# Patient Record
Sex: MALE | Race: BLACK OR AFRICAN AMERICAN | NOT HISPANIC OR LATINO | URBAN - METROPOLITAN AREA
[De-identification: names, ages, dates, MRNs, and addresses within clinical notes are randomized per-mention and may not be internally consistent; named-entity substitution may affect disease eponyms.]

---

## 2023-03-22 ENCOUNTER — INPATIENT (INPATIENT)
Facility: HOSPITAL | Age: 43
LOS: 0 days | Discharge: HOME | End: 2023-03-23
Attending: UROLOGY | Admitting: UROLOGY
Payer: COMMERCIAL

## 2023-03-22 VITALS
HEIGHT: 70 IN | TEMPERATURE: 98 F | DIASTOLIC BLOOD PRESSURE: 97 MMHG | HEART RATE: 125 BPM | RESPIRATION RATE: 16 BRPM | WEIGHT: 179.9 LBS | SYSTOLIC BLOOD PRESSURE: 167 MMHG

## 2023-03-22 DIAGNOSIS — Z98.890 OTHER SPECIFIED POSTPROCEDURAL STATES: Chronic | ICD-10-CM

## 2023-03-22 DIAGNOSIS — N41.0 ACUTE PROSTATITIS: ICD-10-CM

## 2023-03-22 LAB
ALBUMIN SERPL ELPH-MCNC: 3.8 G/DL — SIGNIFICANT CHANGE UP (ref 3.3–5)
ALP SERPL-CCNC: 55 U/L — SIGNIFICANT CHANGE UP (ref 40–120)
ALT FLD-CCNC: 43 U/L — SIGNIFICANT CHANGE UP (ref 12–78)
AMPHET UR-MCNC: NEGATIVE — SIGNIFICANT CHANGE UP
ANION GAP SERPL CALC-SCNC: 10 MMOL/L — SIGNIFICANT CHANGE UP (ref 5–17)
APPEARANCE UR: ABNORMAL
APTT BLD: 27.1 SEC — LOW (ref 27.5–35.5)
AST SERPL-CCNC: 35 U/L — SIGNIFICANT CHANGE UP (ref 15–37)
BACTERIA # UR AUTO: ABNORMAL
BARBITURATES UR SCN-MCNC: NEGATIVE — SIGNIFICANT CHANGE UP
BASOPHILS # BLD AUTO: 0.05 K/UL — SIGNIFICANT CHANGE UP (ref 0–0.2)
BASOPHILS NFR BLD AUTO: 0.3 % — SIGNIFICANT CHANGE UP (ref 0–2)
BENZODIAZ UR-MCNC: NEGATIVE — SIGNIFICANT CHANGE UP
BILIRUB SERPL-MCNC: 0.8 MG/DL — SIGNIFICANT CHANGE UP (ref 0.2–1.2)
BILIRUB UR-MCNC: NEGATIVE — SIGNIFICANT CHANGE UP
BUN SERPL-MCNC: 13 MG/DL — SIGNIFICANT CHANGE UP (ref 7–23)
CALCIUM SERPL-MCNC: 8.9 MG/DL — SIGNIFICANT CHANGE UP (ref 8.5–10.1)
CHLORIDE SERPL-SCNC: 101 MMOL/L — SIGNIFICANT CHANGE UP (ref 96–108)
CO2 SERPL-SCNC: 24 MMOL/L — SIGNIFICANT CHANGE UP (ref 22–31)
COCAINE METAB.OTHER UR-MCNC: NEGATIVE — SIGNIFICANT CHANGE UP
COLOR SPEC: ABNORMAL
CREAT SERPL-MCNC: 1.06 MG/DL — SIGNIFICANT CHANGE UP (ref 0.5–1.3)
DIFF PNL FLD: ABNORMAL
EGFR: 91 ML/MIN/1.73M2 — SIGNIFICANT CHANGE UP
EOSINOPHIL # BLD AUTO: 0.06 K/UL — SIGNIFICANT CHANGE UP (ref 0–0.5)
EOSINOPHIL NFR BLD AUTO: 0.3 % — SIGNIFICANT CHANGE UP (ref 0–6)
ETHANOL SERPL-MCNC: <10 MG/DL — SIGNIFICANT CHANGE UP (ref 0–10)
GLUCOSE SERPL-MCNC: 122 MG/DL — HIGH (ref 70–99)
GLUCOSE UR QL: 250 MG/DL
HCT VFR BLD CALC: 41.7 % — SIGNIFICANT CHANGE UP (ref 39–50)
HGB BLD-MCNC: 14.5 G/DL — SIGNIFICANT CHANGE UP (ref 13–17)
IMM GRANULOCYTES NFR BLD AUTO: 0.4 % — SIGNIFICANT CHANGE UP (ref 0–0.9)
INR BLD: 1.12 RATIO — SIGNIFICANT CHANGE UP (ref 0.88–1.16)
KETONES UR-MCNC: ABNORMAL
LACTATE SERPL-SCNC: 2 MMOL/L — SIGNIFICANT CHANGE UP (ref 0.7–2)
LEUKOCYTE ESTERASE UR-ACNC: ABNORMAL
LYMPHOCYTES # BLD AUTO: 1.74 K/UL — SIGNIFICANT CHANGE UP (ref 1–3.3)
LYMPHOCYTES # BLD AUTO: 9.6 % — LOW (ref 13–44)
MCHC RBC-ENTMCNC: 30.5 PG — SIGNIFICANT CHANGE UP (ref 27–34)
MCHC RBC-ENTMCNC: 34.8 G/DL — SIGNIFICANT CHANGE UP (ref 32–36)
MCV RBC AUTO: 87.8 FL — SIGNIFICANT CHANGE UP (ref 80–100)
METHADONE UR-MCNC: NEGATIVE — SIGNIFICANT CHANGE UP
MONOCYTES # BLD AUTO: 1.14 K/UL — HIGH (ref 0–0.9)
MONOCYTES NFR BLD AUTO: 6.3 % — SIGNIFICANT CHANGE UP (ref 2–14)
NEUTROPHILS # BLD AUTO: 15.14 K/UL — HIGH (ref 1.8–7.4)
NEUTROPHILS NFR BLD AUTO: 83.1 % — HIGH (ref 43–77)
NITRITE UR-MCNC: NEGATIVE — SIGNIFICANT CHANGE UP
NRBC # BLD: 0 /100 WBCS — SIGNIFICANT CHANGE UP (ref 0–0)
OPIATES UR-MCNC: NEGATIVE — SIGNIFICANT CHANGE UP
PCP SPEC-MCNC: SIGNIFICANT CHANGE UP
PCP UR-MCNC: NEGATIVE — SIGNIFICANT CHANGE UP
PH UR: 6 — SIGNIFICANT CHANGE UP (ref 5–8)
PLATELET # BLD AUTO: 241 K/UL — SIGNIFICANT CHANGE UP (ref 150–400)
POTASSIUM SERPL-MCNC: 3.6 MMOL/L — SIGNIFICANT CHANGE UP (ref 3.5–5.3)
POTASSIUM SERPL-SCNC: 3.6 MMOL/L — SIGNIFICANT CHANGE UP (ref 3.5–5.3)
PROT SERPL-MCNC: 7.7 GM/DL — SIGNIFICANT CHANGE UP (ref 6–8.3)
PROT UR-MCNC: 500 MG/DL
PROTHROM AB SERPL-ACNC: 13.4 SEC — SIGNIFICANT CHANGE UP (ref 10.5–13.4)
RAPID RVP RESULT: SIGNIFICANT CHANGE UP
RBC # BLD: 4.75 M/UL — SIGNIFICANT CHANGE UP (ref 4.2–5.8)
RBC # FLD: 14.9 % — HIGH (ref 10.3–14.5)
RBC CASTS # UR COMP ASSIST: ABNORMAL /HPF (ref 0–4)
SARS-COV-2 RNA SPEC QL NAA+PROBE: SIGNIFICANT CHANGE UP
SODIUM SERPL-SCNC: 135 MMOL/L — SIGNIFICANT CHANGE UP (ref 135–145)
SP GR SPEC: 1.02 — SIGNIFICANT CHANGE UP (ref 1.01–1.02)
THC UR QL: NEGATIVE — SIGNIFICANT CHANGE UP
UROBILINOGEN FLD QL: 1 MG/DL
WBC # BLD: 18.21 K/UL — HIGH (ref 3.8–10.5)
WBC # FLD AUTO: 18.21 K/UL — HIGH (ref 3.8–10.5)
WBC UR QL: >50

## 2023-03-22 PROCEDURE — 99285 EMERGENCY DEPT VISIT HI MDM: CPT

## 2023-03-22 PROCEDURE — 99222 1ST HOSP IP/OBS MODERATE 55: CPT

## 2023-03-22 PROCEDURE — 74177 CT ABD & PELVIS W/CONTRAST: CPT | Mod: 26,MA

## 2023-03-22 PROCEDURE — 93010 ELECTROCARDIOGRAM REPORT: CPT

## 2023-03-22 PROCEDURE — 99221 1ST HOSP IP/OBS SF/LOW 40: CPT

## 2023-03-22 RX ORDER — PIPERACILLIN AND TAZOBACTAM 4; .5 G/20ML; G/20ML
3.38 INJECTION, POWDER, LYOPHILIZED, FOR SOLUTION INTRAVENOUS EVERY 8 HOURS
Refills: 0 | Status: DISCONTINUED | OUTPATIENT
Start: 2023-03-22 | End: 2023-03-23

## 2023-03-22 RX ORDER — ACETAMINOPHEN 500 MG
975 TABLET ORAL ONCE
Refills: 0 | Status: COMPLETED | OUTPATIENT
Start: 2023-03-22 | End: 2023-03-22

## 2023-03-22 RX ORDER — SODIUM CHLORIDE 9 MG/ML
2500 INJECTION INTRAMUSCULAR; INTRAVENOUS; SUBCUTANEOUS ONCE
Refills: 0 | Status: COMPLETED | OUTPATIENT
Start: 2023-03-22 | End: 2023-03-22

## 2023-03-22 RX ORDER — SODIUM CHLORIDE 9 MG/ML
1000 INJECTION, SOLUTION INTRAVENOUS
Refills: 0 | Status: DISCONTINUED | OUTPATIENT
Start: 2023-03-22 | End: 2023-03-23

## 2023-03-22 RX ORDER — ACETAMINOPHEN 500 MG
975 TABLET ORAL EVERY 6 HOURS
Refills: 0 | Status: DISCONTINUED | OUTPATIENT
Start: 2023-03-22 | End: 2023-03-23

## 2023-03-22 RX ORDER — PIPERACILLIN AND TAZOBACTAM 4; .5 G/20ML; G/20ML
3.38 INJECTION, POWDER, LYOPHILIZED, FOR SOLUTION INTRAVENOUS ONCE
Refills: 0 | Status: COMPLETED | OUTPATIENT
Start: 2023-03-22 | End: 2023-03-22

## 2023-03-22 RX ORDER — KETOROLAC TROMETHAMINE 30 MG/ML
15 SYRINGE (ML) INJECTION EVERY 6 HOURS
Refills: 0 | Status: DISCONTINUED | OUTPATIENT
Start: 2023-03-22 | End: 2023-03-23

## 2023-03-22 RX ORDER — IOHEXOL 300 MG/ML
30 INJECTION, SOLUTION INTRAVENOUS ONCE
Refills: 0 | Status: COMPLETED | OUTPATIENT
Start: 2023-03-22 | End: 2023-03-22

## 2023-03-22 RX ORDER — HYDRALAZINE HCL 50 MG
10 TABLET ORAL ONCE
Refills: 0 | Status: COMPLETED | OUTPATIENT
Start: 2023-03-22 | End: 2023-03-22

## 2023-03-22 RX ORDER — HYDRALAZINE HCL 50 MG
10 TABLET ORAL EVERY 8 HOURS
Refills: 0 | Status: DISCONTINUED | OUTPATIENT
Start: 2023-03-22 | End: 2023-03-23

## 2023-03-22 RX ORDER — CEFAZOLIN SODIUM 1 G
1000 VIAL (EA) INJECTION ONCE
Refills: 0 | Status: COMPLETED | OUTPATIENT
Start: 2023-03-22 | End: 2023-03-22

## 2023-03-22 RX ORDER — AMLODIPINE BESYLATE 2.5 MG/1
5 TABLET ORAL DAILY
Refills: 0 | Status: DISCONTINUED | OUTPATIENT
Start: 2023-03-22 | End: 2023-03-23

## 2023-03-22 RX ADMIN — SODIUM CHLORIDE 125 MILLILITER(S): 9 INJECTION, SOLUTION INTRAVENOUS at 19:53

## 2023-03-22 RX ADMIN — PIPERACILLIN AND TAZOBACTAM 200 GRAM(S): 4; .5 INJECTION, POWDER, LYOPHILIZED, FOR SOLUTION INTRAVENOUS at 10:12

## 2023-03-22 RX ADMIN — IOHEXOL 30 MILLILITER(S): 300 INJECTION, SOLUTION INTRAVENOUS at 04:52

## 2023-03-22 RX ADMIN — Medication 10 MILLIGRAM(S): at 16:22

## 2023-03-22 RX ADMIN — SODIUM CHLORIDE 2500 MILLILITER(S): 9 INJECTION INTRAMUSCULAR; INTRAVENOUS; SUBCUTANEOUS at 03:29

## 2023-03-22 RX ADMIN — PIPERACILLIN AND TAZOBACTAM 25 GRAM(S): 4; .5 INJECTION, POWDER, LYOPHILIZED, FOR SOLUTION INTRAVENOUS at 22:02

## 2023-03-22 RX ADMIN — Medication 975 MILLIGRAM(S): at 12:48

## 2023-03-22 RX ADMIN — Medication 15 MILLIGRAM(S): at 10:26

## 2023-03-22 RX ADMIN — Medication 100 MILLIGRAM(S): at 06:08

## 2023-03-22 RX ADMIN — Medication 975 MILLIGRAM(S): at 03:28

## 2023-03-22 RX ADMIN — AMLODIPINE BESYLATE 5 MILLIGRAM(S): 2.5 TABLET ORAL at 18:56

## 2023-03-22 RX ADMIN — Medication 975 MILLIGRAM(S): at 04:25

## 2023-03-22 RX ADMIN — Medication 1000 MILLIGRAM(S): at 06:38

## 2023-03-22 RX ADMIN — SODIUM CHLORIDE 125 MILLILITER(S): 9 INJECTION, SOLUTION INTRAVENOUS at 13:38

## 2023-03-22 RX ADMIN — PIPERACILLIN AND TAZOBACTAM 25 GRAM(S): 4; .5 INJECTION, POWDER, LYOPHILIZED, FOR SOLUTION INTRAVENOUS at 13:02

## 2023-03-22 RX ADMIN — SODIUM CHLORIDE 2500 MILLILITER(S): 9 INJECTION INTRAMUSCULAR; INTRAVENOUS; SUBCUTANEOUS at 04:29

## 2023-03-22 RX ADMIN — Medication 15 MILLIGRAM(S): at 10:12

## 2023-03-22 NOTE — PATIENT PROFILE ADULT - FALL HARM RISK - UNIVERSAL INTERVENTIONS
Bed in lowest position, wheels locked, appropriate side rails in place/Call bell, personal items and telephone in reach/Instruct patient to call for assistance before getting out of bed or chair/Non-slip footwear when patient is out of bed/[place] to call system/Physically safe environment - no spills, clutter or unnecessary equipment/Purposeful Proactive Rounding/Room/bathroom lighting operational, light cord in reach

## 2023-03-22 NOTE — ED ADULT TRIAGE NOTE - CHIEF COMPLAINT QUOTE
patient reports rectal pain and slight discomfort after urinating x today. states that he had this before and it was prostatitis. tremors and diaphoretic in triage. last drink yesterday.

## 2023-03-22 NOTE — ED PROVIDER NOTE - PROGRESS NOTE DETAILS
pt. has prostatitis on CT, elevated WBCs, UTI on UA  will need admission for prostatitis, IV antbx  discussed with Dr. Mata, uro, agrees with admit  antbx given, surgical PA called for admit

## 2023-03-22 NOTE — H&P ADULT - NS ATTEND AMEND GEN_ALL_CORE FT
per above  has h/o prior prostatitis  currently feeling better  no dysuria or feve-has wbc 18 with lactate of 2    to observe today-likely d/c in am pending clinical condition  when resolved will need exam and psa-43 yo with  ancestry-discussed high risk groups for CA P

## 2023-03-22 NOTE — H&P ADULT - HISTORY OF PRESENT ILLNESS
Patient is a 42 year old male with PMH HTN, prostatitis x 1, PSH orchiopexy who presents to ED c/o dull, 7/10 pelvic and rectal pain x 1 day. Further describes pain as "heavy pressure". Denies fever, chills, abdominal pain, difficulty urinating, gross hematuria, urinary frequency.

## 2023-03-22 NOTE — ED PROVIDER NOTE - CARE PROVIDER_API CALL
Bulmaro Bansal)  Urology  [address] [place] [address]  Phone: [phone]  Fax: [phone]  Follow Up Time: Urgent

## 2023-03-22 NOTE — CONSULT NOTE ADULT - SUBJECTIVE AND OBJECTIVE BOX
HPI: Patient is a 42 year old male with PMH HTN, prostatitis x 1, PSH orchiopexy who presents to ED c/o dull, 7/10 pelvic and rectal pain x 1 day. Further describes pain as "heavy pressure". Denies fever, chills, abdominal pain, difficulty urinating, gross hematuria, urinary frequency. Patient was found to have acute prostatitis on CT abd and was admitted to urology service and was started on iv zosyn with IVF. Patient was also given iv hydralazine for uncontrolled blood pressure. Hospitalist service is consulted for HTN management.   Patient reported having hx of HTN and taking oral medication for it in the past but he stopped taking on his own.   He reported checking blood pressure off and on and reported it in the range of 150's. No report of any new focal weakness, headache or vision changes or hematuria.     ROS: Except pertinent positives and negatives as mentioned in HPI, all other review of systems including constitutional, HEENT, CVS, Resp, GI, , MSK,  Psychiatry, Neurology, are reviewed and found unremarkable at the time of my evaluation.       Allergies:   levofloxacin (Rash)      HOME MEDICATIONS:  None    Past Medical History:   HTN (hypertension)  chronic tobacco abuse  Hx of alcohol abuse      Past Surgical History:   S/P orchiopexy    Family History:    No history of premature heart problems, cancers reported in family members    Social History:   Denied any illicit drug abuse. Patient used to drink heavy and used to have alcohol withdrawals but in last couple of months, patient started drinking alcohol only over the weekends. No withdrawals reported in last couple of months.   Patient smoked x 7-8 years and quit 1 month ago.   Used to smoke 1 pack in a week.       EXAM:     T(C): 37.1 (23 @ 14:30), Max: 37.5 (23 @ 07:18)  HR: 96 (23 @ 16:58) (75 - 125)  BP: 157/83 (23 @ 16:58) (121/77 - 183/99)  RR: 16 (23 @ 14:30) (16 - 20)  SpO2: 100% (23 @ 14:30) (97% - 100%)      General: NAD, conversant  HEENT: Head is atraumatic and normocephalic.   CVS: S1 S2 normal, RRR, no murmur  Resp: No labored breathing. CTA bilaterally, no wheezing, no crackles   GI: abd soft, non tender. + BS  MSK: Expected ROM in all extremities. No cyanosis. No Clubbing. No edema  Neurology: Grossly non focal.   Psychiatry: Alert, awake. Oriented x 3. Appropriate mood      LABS:                        14.5   18.21 )-----------( 241      ( 22 Mar 2023 03:24 )             41.7                 135  |  101  |  13  ----------------------------<  122<H>  3.6   |  24  |  1.06    Ca    8.9      22 Mar 2023 03:24    TPro  7.7  /  Alb  3.8  /  TBili  0.8  /  DBili  x   /  AST  35  /  ALT  43  /  AlkPhos  55                PT/INR - ( 22 Mar 2023 03:24 )   PT: 13.4 sec;   INR: 1.12 ratio         PTT - ( 22 Mar 2023 03:24 )  PTT:27.1 sec  Urinalysis Basic - ( 22 Mar 2023 03:24 )    Color: Denise / Appearance: Slightly Turbid / S.020 / pH: x  Gluc: x / Ketone: Small  / Bili: Negative / Urobili: 1 mg/dL   Blood: x / Protein: 500 mg/dL / Nitrite: Negative   Leuk Esterase: Moderate / RBC: 6-10 /HPF / WBC >50   Sq Epi: x / Non Sq Epi: x / Bacteria: Moderate      Interval Radiology studies: reviewed   < from: CT Abdomen and Pelvis w/ Oral Cont and w/ IV Cont (23 @ 05:51) >  Heterogeneously enlarged prostate gland suggesting prostatitis. No focal   collection.  Mild thickening of the rectum suggests prostatitis.  Although the urinary bladder is underdistended, there is wall thickening   and hyperemia suggesting cystitis. Correlate with urinalysis.    Fatty liver.    < end of copied text >      INPATIENT MEDICATIONS:   MEDICATIONS  (STANDING):  acetaminophen     Tablet .. 975 milliGRAM(s) Oral every 6 hours  amLODIPine   Tablet 5 milliGRAM(s) Oral daily  lactated ringers. 1000 milliLiter(s) (125 mL/Hr) IV Continuous <Continuous>  piperacillin/tazobactam IVPB.. 3.375 Gram(s) IV Intermittent every 8 hours    MEDICATIONS  (PRN):  hydrALAZINE Injectable 10 milliGRAM(s) IV Push every 8 hours PRN SBP>160  ketorolac   Injectable 15 milliGRAM(s) IV Push every 6 hours PRN Moderate Pain (4 - 6)

## 2023-03-22 NOTE — ED PROVIDER NOTE - CLINICAL SUMMARY MEDICAL DECISION MAKING FREE TEXT BOX
41 yo M with possible prostatitis, uti  -basic labs, eoth, drug screen, blood cx, ua, cx, drug screen, lactate, coags, rvp, trop, CT ab/pel, EKg, iv, levofloxacin coverage, hydration bolus, tylenol for pain, monitor  -f/u results, reeval

## 2023-03-22 NOTE — ED PROVIDER NOTE - OBJECTIVE STATEMENT
43 yo M with 1 day of rectal pain, constant, first noticed while seated.  No inciting event.  Pt. felt this once before about 5 years ago and was diagnosed with prostatitis.  No other complaints.   Pt. denies constitutional symptoms.   ROS: negative for fever, cough, headache, chest pain, shortness of breath, abd pain, nausea, vomiting, diarrhea, rash, paresthesia, and weakness--all other systems reviewed are negative.   PMH: prostatitis in past, pt. denies other PMH; Meds: Denies; SH: Denies smoking/drinking/drug use

## 2023-03-22 NOTE — ED PROVIDER NOTE - PHYSICAL EXAMINATION
Vitals: HTn at 148/82, otherwise WNL  Gen: AAOx3, NAD, sitting comfortably in stretcher, calm, non-toxic  Head: ncat, perrla, eomi b/l  Neck: supple, no lymphadenopathy, no midline deviation  Heart: rrr, no m/r/g  Lungs: CTA b/l, no rales/ronchi/wheezes  Abd: soft, nontender, non-distended, no rebound or guarding  Ext: no clubbing/cyanosis/edema  Neuro: sensation and muscle strength intact b/l, steady gait

## 2023-03-22 NOTE — ED PROVIDER NOTE - CARE PLAN
Principal Discharge DX:	Acute UTI   1 Principal Discharge DX:	Prostatitis  Secondary Diagnosis:	Acute UTI

## 2023-03-22 NOTE — CONSULT NOTE ADULT - ASSESSMENT
Patient is a 42 year old male with PMH HTN, prostatitis x 1, PSH orchiopexy who presents to ED c/o dull, 7/10 pelvic and rectal pain x 1 day. Further describes pain as "heavy pressure". Denies fever, chills, abdominal pain, difficulty urinating, gross hematuria, urinary frequency. Patient was found to have acute prostatitis on CT abd and was admitted to urology service and was started on iv zosyn with IVF. Patient was also given iv hydralazine for uncontrolled blood pressure. Hospitalist service is consulted for HTN management.   Patient reported having hx of HTN and taking oral medication for it in the past but he stopped taking on his own.   He reported checking blood pressure off and on and reported it in the range of 150's. No report of any new focal weakness, headache or vision changes or hematuria.     Acute prostatitis. Cont iv zosyn and IVF. follow cultures and de-escalate antibiotic therapy accordingly. Trend leukocytosis.   HTN. uncontrolled due to non compliance. Start low dose amlodipine with prn iv hydralazine. Monitor. Low salt diet. Will try to discontinue or slow down IVF when ok with surgery service.   Chronic tobacco abuse. counseling provided. Patient refused nicotine patch  Hx of alcohol abuse and alcohol withdrawals in the past. no withdrawal at this time. Monitor for any withdrawal while inpatient.   Overweight. Diet and exercise.     Full code  DVT ppx: Low risk.   Thank you for consulting hospitalist service, we will continue to follow along.

## 2023-03-22 NOTE — ED ADULT NURSE REASSESSMENT NOTE - NS ED NURSE REASSESS COMMENT FT1
received pt from previous RN with no acute distress, non labored breathing on room air, pt is A&0x3, pt c/o headache and states he is hungry, pt is aware of care plan of being admitted, MD made aware of pt's complaints, will bring pt breakfast tray, safety precautions are in place, nursing care continues
Abx picked up from pharmacy, pt in CT at this time. Will initiate abx upon completion of CT.

## 2023-03-23 ENCOUNTER — TRANSCRIPTION ENCOUNTER (OUTPATIENT)
Age: 43
End: 2023-03-23

## 2023-03-23 VITALS
HEART RATE: 88 BPM | DIASTOLIC BLOOD PRESSURE: 95 MMHG | SYSTOLIC BLOOD PRESSURE: 151 MMHG | RESPIRATION RATE: 18 BRPM | TEMPERATURE: 98 F | OXYGEN SATURATION: 100 %

## 2023-03-23 LAB
ANION GAP SERPL CALC-SCNC: 6 MMOL/L — SIGNIFICANT CHANGE UP (ref 5–17)
BASOPHILS # BLD AUTO: 0.05 K/UL — SIGNIFICANT CHANGE UP (ref 0–0.2)
BASOPHILS NFR BLD AUTO: 0.3 % — SIGNIFICANT CHANGE UP (ref 0–2)
BUN SERPL-MCNC: 7 MG/DL — SIGNIFICANT CHANGE UP (ref 7–23)
C TRACH RRNA SPEC QL NAA+PROBE: SIGNIFICANT CHANGE UP
CALCIUM SERPL-MCNC: 9.4 MG/DL — SIGNIFICANT CHANGE UP (ref 8.5–10.1)
CHLORIDE SERPL-SCNC: 102 MMOL/L — SIGNIFICANT CHANGE UP (ref 96–108)
CO2 SERPL-SCNC: 25 MMOL/L — SIGNIFICANT CHANGE UP (ref 22–31)
CREAT SERPL-MCNC: 0.91 MG/DL — SIGNIFICANT CHANGE UP (ref 0.5–1.3)
EGFR: 109 ML/MIN/1.73M2 — SIGNIFICANT CHANGE UP
EOSINOPHIL # BLD AUTO: 0.05 K/UL — SIGNIFICANT CHANGE UP (ref 0–0.5)
EOSINOPHIL NFR BLD AUTO: 0.3 % — SIGNIFICANT CHANGE UP (ref 0–6)
GLUCOSE SERPL-MCNC: 82 MG/DL — SIGNIFICANT CHANGE UP (ref 70–99)
HCT VFR BLD CALC: 42.8 % — SIGNIFICANT CHANGE UP (ref 39–50)
HGB BLD-MCNC: 14.4 G/DL — SIGNIFICANT CHANGE UP (ref 13–17)
IMM GRANULOCYTES NFR BLD AUTO: 0.6 % — SIGNIFICANT CHANGE UP (ref 0–0.9)
LYMPHOCYTES # BLD AUTO: 14.3 % — SIGNIFICANT CHANGE UP (ref 13–44)
LYMPHOCYTES # BLD AUTO: 2.28 K/UL — SIGNIFICANT CHANGE UP (ref 1–3.3)
MCHC RBC-ENTMCNC: 29.7 PG — SIGNIFICANT CHANGE UP (ref 27–34)
MCHC RBC-ENTMCNC: 33.6 G/DL — SIGNIFICANT CHANGE UP (ref 32–36)
MCV RBC AUTO: 88.2 FL — SIGNIFICANT CHANGE UP (ref 80–100)
MONOCYTES # BLD AUTO: 0.71 K/UL — SIGNIFICANT CHANGE UP (ref 0–0.9)
MONOCYTES NFR BLD AUTO: 4.4 % — SIGNIFICANT CHANGE UP (ref 2–14)
N GONORRHOEA RRNA SPEC QL NAA+PROBE: SIGNIFICANT CHANGE UP
NEUTROPHILS # BLD AUTO: 12.82 K/UL — HIGH (ref 1.8–7.4)
NEUTROPHILS NFR BLD AUTO: 80.1 % — HIGH (ref 43–77)
NRBC # BLD: 0 /100 WBCS — SIGNIFICANT CHANGE UP (ref 0–0)
PLATELET # BLD AUTO: 224 K/UL — SIGNIFICANT CHANGE UP (ref 150–400)
POTASSIUM SERPL-MCNC: 3.7 MMOL/L — SIGNIFICANT CHANGE UP (ref 3.5–5.3)
POTASSIUM SERPL-SCNC: 3.7 MMOL/L — SIGNIFICANT CHANGE UP (ref 3.5–5.3)
RBC # BLD: 4.85 M/UL — SIGNIFICANT CHANGE UP (ref 4.2–5.8)
RBC # FLD: 14.5 % — SIGNIFICANT CHANGE UP (ref 10.3–14.5)
SODIUM SERPL-SCNC: 133 MMOL/L — LOW (ref 135–145)
SPECIMEN SOURCE: SIGNIFICANT CHANGE UP
WBC # BLD: 16 K/UL — HIGH (ref 3.8–10.5)
WBC # FLD AUTO: 16 K/UL — HIGH (ref 3.8–10.5)

## 2023-03-23 PROCEDURE — 76856 US EXAM PELVIC COMPLETE: CPT | Mod: 26

## 2023-03-23 PROCEDURE — 99232 SBSQ HOSP IP/OBS MODERATE 35: CPT

## 2023-03-23 PROCEDURE — 99233 SBSQ HOSP IP/OBS HIGH 50: CPT

## 2023-03-23 RX ORDER — AMLODIPINE BESYLATE 2.5 MG/1
10 TABLET ORAL DAILY
Refills: 0 | Status: DISCONTINUED | OUTPATIENT
Start: 2023-03-24 | End: 2023-03-23

## 2023-03-23 RX ORDER — AMLODIPINE BESYLATE 2.5 MG/1
1 TABLET ORAL
Qty: 30 | Refills: 0
Start: 2023-03-23

## 2023-03-23 RX ORDER — AMLODIPINE BESYLATE 2.5 MG/1
5 TABLET ORAL ONCE
Refills: 0 | Status: COMPLETED | OUTPATIENT
Start: 2023-03-23 | End: 2023-03-23

## 2023-03-23 RX ADMIN — PIPERACILLIN AND TAZOBACTAM 25 GRAM(S): 4; .5 INJECTION, POWDER, LYOPHILIZED, FOR SOLUTION INTRAVENOUS at 14:48

## 2023-03-23 RX ADMIN — AMLODIPINE BESYLATE 5 MILLIGRAM(S): 2.5 TABLET ORAL at 18:27

## 2023-03-23 RX ADMIN — Medication 975 MILLIGRAM(S): at 00:46

## 2023-03-23 RX ADMIN — SODIUM CHLORIDE 125 MILLILITER(S): 9 INJECTION, SOLUTION INTRAVENOUS at 15:13

## 2023-03-23 RX ADMIN — AMLODIPINE BESYLATE 5 MILLIGRAM(S): 2.5 TABLET ORAL at 05:44

## 2023-03-23 RX ADMIN — PIPERACILLIN AND TAZOBACTAM 25 GRAM(S): 4; .5 INJECTION, POWDER, LYOPHILIZED, FOR SOLUTION INTRAVENOUS at 05:44

## 2023-03-23 RX ADMIN — Medication 10 MILLIGRAM(S): at 20:57

## 2023-03-23 NOTE — DISCHARGE NOTE NURSING/CASE MANAGEMENT/SOCIAL WORK - NSDCPEFALRISK_GEN_ALL_CORE
For information on Fall & Injury Prevention, visit: [email or web address] OR  [email or web address] OR  https://www.cdc.gov/steadi/patient.html

## 2023-03-23 NOTE — DISCHARGE NOTE PROVIDER - NSDCCPCAREPLAN_GEN_ALL_CORE_FT
PRINCIPAL DISCHARGE DIAGNOSIS  Diagnosis: Prostatitis  Assessment and Plan of Treatment:       SECONDARY DISCHARGE DIAGNOSES  Diagnosis: Acute UTI  Assessment and Plan of Treatment:

## 2023-03-23 NOTE — DISCHARGE NOTE PROVIDER - HOSPITAL COURSE
42M h/o HTN, prostatitis, orchiopexy a/w prostatitis. IV antibiotics were given. Medicine was consulted for co-management. US showed enlarged prostate, no abscess. At the time of discharge, the patient was hemodynamically stable, was tolerating PO diet, was voiding urine, was ambulating, and was comfortable without any pain. 42M h/o HTN, prostatitis, orchiopexy a/w prostatitis. IV antibiotics were given. Medicine was consulted for co-management. US showed enlarged prostate, no abscess. At the time of discharge, the patient was hemodynamically stable, was tolerating PO diet, was voiding urine, was ambulating, and was comfortable without any pain.     I agree with the statements above.  Kasey

## 2023-03-23 NOTE — DISCHARGE NOTE PROVIDER - CARE PROVIDER_API CALL
Robert Mata  Urology  [address] [id number]  Phone: [phone]  Fax: [phone]  Phone: (   )    -  Fax: (   )    -  Follow Up Time:

## 2023-03-23 NOTE — DISCHARGE NOTE PROVIDER - NSDCFUADDAPPT_GEN_ALL_CORE_FT
Follow up in 1 week with Dr. Mata. Please call the office to make an appointment.    Follow up with your primary care physician in 1 week. Please call the office to make an appointment.

## 2023-03-23 NOTE — PROGRESS NOTE ADULT - SUBJECTIVE AND OBJECTIVE BOX
Pt seen and examined at bedside in no distress.  No complaints. Symptoms entirely resolved.  Voiding without issue. No fevers/chills.     T(F): 98.7 (03-23-23 @ 10:39), Max: 99.4 (03-22-23 @ 23:31)  HR: 108 (03-23-23 @ 10:39) (75 - 108)  BP: 154/107 (03-23-23 @ 10:39) (143/82 - 183/99)  RR: 18 (03-23-23 @ 10:39) (16 - 18)  SpO2: 99% (03-23-23 @ 10:39) (98% - 100%)    PHYSICAL EXAM:  General: Alert & oriented x 3  CV: +S1S2 tachycardic   Lung: Respirations nonlabored   Abdomen: Soft  Extremities: No pedal edema b/l   : No SP tenderness, no palpable bladder distention, NL external genitalia     LABS:                        14.4   16.00 )-----------( 224      ( 23 Mar 2023 05:45 )             42.8     03-23    133<L>  |  102  |  7   ----------------------------<  82  3.7   |  25  |  0.91    Ca    9.4      23 Mar 2023 05:45    TPro  7.7  /  Alb  3.8  /  TBili  0.8  /  DBili  x   /  AST  35  /  ALT  43  /  AlkPhos  55  03-22    PT/INR - ( 22 Mar 2023 03:24 )   PT: 13.4 sec;   INR: 1.12 ratio      A/P: 42M a/w prostatitis, hypertensive   -- prostate US  -- discussed case with medical attending; stable for d/c from their standpoint on norvasc with OP f/u   -- discharge planning on cipro 500mg bid x 5 days pending prostate US  -- discussed with Dr. Mata 
CHIEF COMPLAINT: Follow up of acute prostatitis and HTN  no headache  rectal pain better  no vomiting  no diarrhea       PHYSICAL EXAM:    GENERAL:  no acute distress   CHEST/LUNG:  No wheezing, no crackles   HEART: Regular rate and rhythm; No murmurs  ABDOMEN: Soft, Nontender, Nondistended; Bowel sounds present  EXTREMITIES:  No clubbing, cyanosis, or edema  NERVOUS SYSTEM:  Grossly non focal.  Psychiatry: AAO x 3, mood is appropriate       OBJECTIVE DATA:   Vital Signs Last 24 Hrs  T(C): 37.1 (23 Mar 2023 04:08), Max: 37.4 (22 Mar 2023 23:31)  T(F): 98.7 (23 Mar 2023 04:08), Max: 99.4 (22 Mar 2023 23:31)  HR: 87 (23 Mar 2023 04:08) (75 - 99)  BP: 143/82 (23 Mar 2023 04:08) (143/82 - 183/99)  BP(mean): --  RR: 18 (23 Mar 2023 04:08) (16 - 18)  SpO2: 99% (23 Mar 2023 04:08) (98% - 100%)    Parameters below as of 23 Mar 2023 04:08  Patient On (Oxygen Delivery Method): room air      LABS:                        14.4   16.00 )-----------( 224      ( 23 Mar 2023 05:45 )             42.8             03-23    133<L>  |  102  |  7   ----------------------------<  82  3.7   |  25  |  0.91    Ca    9.4      23 Mar 2023 05:45    TPro  7.7  /  Alb  3.8  /  TBili  0.8  /  DBili  x   /  AST  35  /  ALT  43  /  AlkPhos  55  03-22              PT/INR - ( 22 Mar 2023 03:24 )   PT: 13.4 sec;   INR: 1.12 ratio         PTT - ( 22 Mar 2023 03:24 )  PTT:27.1 sec  Urinalysis Basic - ( 22 Mar 2023 03:24 )    Color: Denise / Appearance: Slightly Turbid / S.020 / pH: x  Gluc: x / Ketone: Small  / Bili: Negative / Urobili: 1 mg/dL   Blood: x / Protein: 500 mg/dL / Nitrite: Negative   Leuk Esterase: Moderate / RBC: 6-10 /HPF / WBC >50   Sq Epi: x / Non Sq Epi: x / Bacteria: Moderate            CAPILLARY BLOOD GLUCOSE            MEDICATIONS  (STANDING):  acetaminophen     Tablet .. 975 milliGRAM(s) Oral every 6 hours  amLODIPine   Tablet 5 milliGRAM(s) Oral daily  lactated ringers. 1000 milliLiter(s) (125 mL/Hr) IV Continuous <Continuous>  piperacillin/tazobactam IVPB.. 3.375 Gram(s) IV Intermittent every 8 hours    MEDICATIONS  (PRN):  hydrALAZINE Injectable 10 milliGRAM(s) IV Push every 8 hours PRN SBP>160  ketorolac   Injectable 15 milliGRAM(s) IV Push every 6 hours PRN Moderate Pain (4 - 6)

## 2023-03-23 NOTE — PROGRESS NOTE ADULT - ASSESSMENT
Patient is a 42 year old male with PMH HTN, prostatitis x 1, PSH orchiopexy who presents to ED c/o dull, 7/10 pelvic and rectal pain x 1 day. Further describes pain as "heavy pressure". Denies fever, chills, abdominal pain, difficulty urinating, gross hematuria, urinary frequency. Patient was found to have acute prostatitis on CT abd and was admitted to urology service and was started on iv zosyn with IVF. Patient was also given iv hydralazine for uncontrolled blood pressure. Hospitalist service is consulted for HTN management.   Patient reported having hx of HTN and taking oral medication for it in the past but he stopped taking on his own.   He reported checking blood pressure off and on and reported it in the range of 150's. No report of any new focal weakness, headache or vision changes or hematuria.     Acute prostatitis. Cont iv zosyn and IVF. follow cultures and de-escalate antibiotic therapy accordingly.   HTN. uncontrolled due to non compliance. better controlled. cont current management and monitor closely. Discussed with surgery service.   Chronic tobacco abuse. counseling provided. Patient refused nicotine patch  Hx of alcohol abuse and alcohol withdrawals in the past. no withdrawal at this time. Monitor for any withdrawal while inpatient.   Overweight. Diet and exercise.     Full code  DVT ppx: Low risk.   Thank you for consulting hospitalist service, we will continue to follow along.        Patient is a 42 year old male with PMH HTN, prostatitis x 1, PSH orchiopexy who presents to ED c/o dull, 7/10 pelvic and rectal pain x 1 day. Further describes pain as "heavy pressure". Denies fever, chills, abdominal pain, difficulty urinating, gross hematuria, urinary frequency. Patient was found to have acute prostatitis on CT abd and was admitted to urology service and was started on iv zosyn with IVF. Patient was also given iv hydralazine for uncontrolled blood pressure. Hospitalist service is consulted for HTN management.   Patient reported having hx of HTN and taking oral medication for it in the past but he stopped taking on his own.   He reported checking blood pressure off and on and reported it in the range of 150's. No report of any new focal weakness, headache or vision changes or hematuria.     Acute prostatitis. Cont iv zosyn and IVF. follow cultures and de-escalate antibiotic therapy accordingly.   HTN. uncontrolled due to non compliance. better controlled. No contraindications to discharging patient home on oral norvasc 10 mg daily. advised patient to check his blood pressure at home and keep a log for PCP. Low sodium diet.   Chronic tobacco abuse. counseling provided. Patient refused nicotine patch  Hx of alcohol abuse and alcohol withdrawals in the past. no withdrawal at this time.   Overweight. Diet and exercise.     Full code  DVT ppx: Low risk.   Thank you for consulting hospitalist service, we will continue to follow along.

## 2023-03-23 NOTE — DISCHARGE NOTE PROVIDER - NSDCFUADDINST_GEN_ALL_CORE_FT
Take antibiotics and blood pressure medication as prescribed. Record blood pressure at home.   Activity as tolerated. Rest as needed. Call for any fever, severe pain, or other abnormalities.

## 2023-03-23 NOTE — DISCHARGE NOTE PROVIDER - NSDCMRMEDTOKEN_GEN_ALL_CORE_FT
sulfamethoxazole-trimethoprim 400 mg-80 mg oral tablet: 2 tab(s) orally 2 times a day   amLODIPine 10 mg oral tablet: 1 tab(s) orally once a day   sulfamethoxazole-trimethoprim 400 mg-80 mg oral tablet: 2 tab(s) orally 2 times a day

## 2023-03-23 NOTE — CHART NOTE - NSCHARTNOTEFT_GEN_A_CORE
Notified by RN that patient is adamantly requesting to go home now. Patient seen and examined at bedside. Denies pain or any complaints. Voiding clear yellow urine well, no dysuria or hematuria. Denies cp, sob, dizziness.    Vital Signs Last 24 Hrs  T(C): 36.9 (23 Mar 2023 20:52), Max: 37.4 (22 Mar 2023 23:31)  T(F): 98.4 (23 Mar 2023 20:52), Max: 99.4 (22 Mar 2023 23:31)  HR: 94 (23 Mar 2023 20:52) (87 - 108)  BP: 167/113 (23 Mar 2023 20:52) (143/82 - 167/113)  BP(mean): --  RR: 18 (23 Mar 2023 20:52) (16 - 18)  SpO2: 100% (23 Mar 2023 20:52) (98% - 100%)  Parameters below as of 23 Mar 2023 10:39  Patient On (Oxygen Delivery Method): room air    PHYSICAL EXAM:  CONSTITUTIONAL: NAD  HEENT:  Atraumatic, Normocephalic  RESPIRATORY: Clear to ausculation, bilaterally   CARDIOVASCULAR: RRR S1S2  GASTROINTESTINAL: non distended, +BS, soft, non tender, no guarding  : No suprapubic tenderness or bladder distention.   MUSCULOSKELETAL:  calf soft, non tender b/l    < from: US Pelvis Complete (US Pelvis Complete .) (03.23.23 @ 15:00) >    FINDINGS:  Urinary bladder: Within normal limits. Prevoid volume was 188 cc. Patient   unable to void.  Prostate: Enlarged measuring approximately 39 cc in volume. The prostate   is heterogeneous. No sonographicallyconvincing collection is identified   to suggest an abscess. If needed correlation with MR imaging may be   considered.    IMPRESSION:  Enlarged prostate. No sonographic evidence of an abscess.    < end of copied text >        42M h/o HTN, prostatitis, orchiopexy a/w prostatitis. Requesting to go home.  -As discussed with Dr. Mata, will d/c patient now on Bactrim BID x 7 days and Amlodipine. Pt will follow up in one week with Dr. Mata and his PCP.  -BP medication given and RN will recheck BP before d/c

## 2023-03-23 NOTE — DISCHARGE NOTE PROVIDER - PROVIDER TOKENS
FREE:[LAST:[Adolfo],FIRST:[Robert],PHONE:[(   )    -],FAX:[(   )    -],ADDRESS:[Urology  [address] [place] [address]  Phone: [phone]  Fax: [phone]]]

## 2023-03-23 NOTE — DISCHARGE NOTE NURSING/CASE MANAGEMENT/SOCIAL WORK - PATIENT PORTAL LINK FT
You can access the FollowMyHealth Patient Portal offered by [facility] by registering at the following website: [email or web address]. By joining [employer] [facility]’s FollowMyHealth portal, you will also be able to view your health information using other applications (apps) compatible with our system.

## 2023-03-27 PROBLEM — I10 ESSENTIAL (PRIMARY) HYPERTENSION: Chronic | Status: ACTIVE | Noted: 2023-03-22

## 2023-03-27 LAB
CULTURE RESULTS: SIGNIFICANT CHANGE UP
CULTURE RESULTS: SIGNIFICANT CHANGE UP
SPECIMEN SOURCE: SIGNIFICANT CHANGE UP
SPECIMEN SOURCE: SIGNIFICANT CHANGE UP

## 2023-03-28 DIAGNOSIS — I10 ESSENTIAL (PRIMARY) HYPERTENSION: ICD-10-CM

## 2023-03-28 DIAGNOSIS — N41.0 ACUTE PROSTATITIS: ICD-10-CM

## 2023-03-28 DIAGNOSIS — Z72.0 TOBACCO USE: ICD-10-CM

## 2023-03-28 DIAGNOSIS — K62.89 OTHER SPECIFIED DISEASES OF ANUS AND RECTUM: ICD-10-CM

## 2023-03-28 DIAGNOSIS — E11.65 TYPE 2 DIABETES MELLITUS WITH HYPERGLYCEMIA: ICD-10-CM

## 2023-03-28 DIAGNOSIS — N39.0 URINARY TRACT INFECTION, SITE NOT SPECIFIED: ICD-10-CM

## 2023-04-04 ENCOUNTER — APPOINTMENT (OUTPATIENT)
Dept: UROLOGY | Facility: CLINIC | Age: 43
End: 2023-04-04
Payer: COMMERCIAL

## 2023-04-04 VITALS
DIASTOLIC BLOOD PRESSURE: 88 MMHG | BODY MASS INDEX: 26.48 KG/M2 | TEMPERATURE: 98 F | SYSTOLIC BLOOD PRESSURE: 149 MMHG | WEIGHT: 185 LBS | OXYGEN SATURATION: 98 % | HEIGHT: 70 IN | HEART RATE: 84 BPM

## 2023-04-04 DIAGNOSIS — I10 ESSENTIAL (PRIMARY) HYPERTENSION: ICD-10-CM

## 2023-04-04 DIAGNOSIS — Z78.9 OTHER SPECIFIED HEALTH STATUS: ICD-10-CM

## 2023-04-04 PROCEDURE — 99213 OFFICE O/P EST LOW 20 MIN: CPT

## 2023-04-04 PROCEDURE — 51798 US URINE CAPACITY MEASURE: CPT

## 2023-04-04 RX ORDER — AMLODIPINE BESYLATE 10 MG/1
10 TABLET ORAL
Refills: 0 | Status: ACTIVE | COMMUNITY
Start: 2023-04-04

## 2023-04-04 RX ORDER — LEVOFLOXACIN 750 MG/1
TABLET, FILM COATED ORAL
Refills: 0 | Status: DISCONTINUED | COMMUNITY

## 2023-04-04 NOTE — PHYSICAL EXAM
[General Appearance - Well Developed] : well developed [General Appearance - Well Nourished] : well nourished [Normal Appearance] : normal appearance [Well Groomed] : well groomed [General Appearance - In No Acute Distress] : no acute distress [Edema] : no peripheral edema [Respiration, Rhythm And Depth] : normal respiratory rhythm and effort [Exaggerated Use Of Accessory Muscles For Inspiration] : no accessory muscle use [Abdomen Soft] : soft [Abdomen Tenderness] : non-tender [Costovertebral Angle Tenderness] : no ~M costovertebral angle tenderness [Urethral Meatus] : meatus normal [Scrotum] : the scrotum was normal [Epididymis] : the epididymides were normal [Testes Tenderness] : no tenderness of the testes [Testes Mass (___cm)] : there were no testicular masses [Anus Abnormality] : the anus and perineum were normal [Rectal Exam - Rectum] : digital rectal exam was normal [Prostate Tenderness] : the prostate was not tender [No Prostate Nodules] : no prostate nodules [Prostate Size ___ (0-4)] : prostate size [unfilled] (scale: 0-4) [Normal Station and Gait] : the gait and station were normal for the patient's age [] : no rash [No Focal Deficits] : no focal deficits [Oriented To Time, Place, And Person] : oriented to person, place, and time [Affect] : the affect was normal [Mood] : the mood was normal [Not Anxious] : not anxious [No Palpable Adenopathy] : no palpable adenopathy [Penis Abnormality] : normal circumcised penis

## 2023-04-04 NOTE — END OF VISIT
[FreeTextEntry3] : All medical record entries made by the scribe today, were at my direction and personally dictated to them by me, Dr. Anthony Shaikh on 04/04/2023. I have reviewed the chart and agree that the record accurately reflects my personal performance of the history, physical exam, assessment, and plan. I have also personally directed, reviewed, and agreed with the chart.\par

## 2023-04-04 NOTE — HISTORY OF PRESENT ILLNESS
Pt slept 6.5 hours.  She was a wake x1 breifly.   [FreeTextEntry1] : 42 year old male hospitalized 3/22/2023 for acute prostatitis-positive c/s-d/c'd with bactrim\par \par at the time, had GI symptoms- feeling of abdominal fullness, denies fever or dysuria\par \par also had high BP while in hospital and was starterd with amlodipine\par \par transabdominal prostate US 3/23/2023 showed enlarged prostate, no abscess\par \par received IV antibiotics in hospital, discharged on 3/23/2023 w 1 week of bactrim, which he has completed, \par \par feels much better, symptoms have resolved\par \par h/o HTN, prostatitis, and bilateral orchiopexy for testicular torsion as a child

## 2023-04-04 NOTE — ASSESSMENT
[FreeTextEntry1] : 42 year old male w h/o prostatitis, here for evaluation s/p hospitalization 3/22/2023\par \par UA+micro, urine culture sent today\par \par , prostatitis symptoms resolved after completion of bactrim\par \par PVR today: 0\par \par f/u in 6-8 weeks for PSA, then as discussed in hospital, can return once a year for annual exam, labs, and PSA

## 2023-05-23 LAB
APPEARANCE: CLEAR
BACTERIA UR CULT: NORMAL
BACTERIA: NEGATIVE
BILIRUBIN URINE: NEGATIVE
BLOOD URINE: NEGATIVE
COLOR: YELLOW
GLUCOSE QUALITATIVE U: NEGATIVE
HYALINE CASTS: 0 /LPF
KETONES URINE: NEGATIVE
LEUKOCYTE ESTERASE URINE: NEGATIVE
MICROSCOPIC-UA: NORMAL
NITRITE URINE: NEGATIVE
PH URINE: 6
PROTEIN URINE: ABNORMAL
RED BLOOD CELLS URINE: 3 /HPF
SPECIFIC GRAVITY URINE: 1.02
SQUAMOUS EPITHELIAL CELLS: 0 /HPF
UROBILINOGEN URINE: NORMAL
WHITE BLOOD CELLS URINE: 3 /HPF

## 2023-05-26 ENCOUNTER — APPOINTMENT (OUTPATIENT)
Dept: UROLOGY | Facility: CLINIC | Age: 43
End: 2023-05-26
Payer: COMMERCIAL

## 2023-05-26 VITALS
DIASTOLIC BLOOD PRESSURE: 99 MMHG | OXYGEN SATURATION: 99 % | TEMPERATURE: 98.1 F | SYSTOLIC BLOOD PRESSURE: 172 MMHG | HEART RATE: 98 BPM

## 2023-05-26 PROCEDURE — 99213 OFFICE O/P EST LOW 20 MIN: CPT

## 2023-06-14 ENCOUNTER — NON-APPOINTMENT (OUTPATIENT)
Age: 43
End: 2023-06-14

## 2023-06-14 LAB
APPEARANCE: CLEAR
BACTERIA: NEGATIVE /HPF
BILIRUBIN URINE: NEGATIVE
BLOOD URINE: NEGATIVE
CAST: 0 /LPF
COLOR: YELLOW
EPITHELIAL CELLS: 0 /HPF
GLUCOSE QUALITATIVE U: NEGATIVE MG/DL
KETONES URINE: ABNORMAL MG/DL
LEUKOCYTE ESTERASE URINE: NEGATIVE
MICROSCOPIC-UA: NORMAL
NITRITE URINE: NEGATIVE
PH URINE: 6
PROTEIN URINE: 30 MG/DL
PSA SERPL-MCNC: 3.14 NG/ML
RED BLOOD CELLS URINE: 0 /HPF
SPECIFIC GRAVITY URINE: 1.03
URINE CYTOLOGY: NORMAL
UROBILINOGEN URINE: 1 MG/DL
WHITE BLOOD CELLS URINE: 2 /HPF

## 2023-06-14 NOTE — END OF VISIT
[FreeTextEntry3] : All medical record entries made by the scribe today, were at my direction and personally dictated to them by me, Dr. Anthony Shaikh on 05/26/2023. I have reviewed the chart and agree that the record accurately reflects my personal performance of the history, physical exam, assessment, and plan. I have also personally directed, reviewed, and agreed with the chart.\par

## 2023-06-14 NOTE — HISTORY OF PRESENT ILLNESS
[FreeTextEntry1] : 43 y/o male following up for acute prostatitis and labs\par \par hospitalized 3/22/2023-3/23/2023 for acute prostatitis,\par for which he completed course of  bactrim w complete resolution of symptoms\par \par transabdominal prostate US 3/23/2023 showed enlarged prostate, no abscess\par \par no complaints urinating\par \par UA+ micro 4/3/2023-\par proteinuria, 3 WBC's, 3 RBC's, otherwise normal\par \par urine culture 4/3/2023- neg\par \par h/o HTN, prostatitis, and bilateral orchiopexy for testicular torsion as a child \par

## 2023-06-14 NOTE — ASSESSMENT
[FreeTextEntry1] : 41 y/o male w h/o prostatitis 2 months ago-resolved\par \par UA+ micro, urine cytology, and PSA sent today\par \par no voiding complaints \par \par pending results-, f/u in 1 yr

## 2023-07-26 ENCOUNTER — APPOINTMENT (OUTPATIENT)
Dept: UROLOGY | Facility: CLINIC | Age: 43
End: 2023-07-26
Payer: COMMERCIAL

## 2023-07-26 ENCOUNTER — LABORATORY RESULT (OUTPATIENT)
Age: 43
End: 2023-07-26

## 2023-07-26 VITALS
HEART RATE: 75 BPM | TEMPERATURE: 97.6 F | DIASTOLIC BLOOD PRESSURE: 111 MMHG | RESPIRATION RATE: 16 BRPM | SYSTOLIC BLOOD PRESSURE: 175 MMHG | OXYGEN SATURATION: 100 %

## 2023-07-26 DIAGNOSIS — N40.0 BENIGN PROSTATIC HYPERPLASIA WITHOUT LOWER URINARY TRACT SYMPMS: ICD-10-CM

## 2023-07-26 DIAGNOSIS — Q63.1 LOBULATED, FUSED AND HORSESHOE KIDNEY: ICD-10-CM

## 2023-07-26 DIAGNOSIS — N41.0 ACUTE PROSTATITIS: ICD-10-CM

## 2023-07-26 DIAGNOSIS — N28.1 CYST OF KIDNEY, ACQUIRED: ICD-10-CM

## 2023-07-26 LAB
ANION GAP SERPL CALC-SCNC: 13 MMOL/L
APPEARANCE: CLEAR
BILIRUBIN URINE: NEGATIVE
BLOOD URINE: NEGATIVE
BUN SERPL-MCNC: 10 MG/DL
CALCIUM SERPL-MCNC: 10.5 MG/DL
CHLORIDE SERPL-SCNC: 99 MMOL/L
CO2 SERPL-SCNC: 23 MMOL/L
COLOR: YELLOW
CREAT SERPL-MCNC: 0.9 MG/DL
EGFR: 111 ML/MIN/1.73M2
GLUCOSE QUALITATIVE U: NEGATIVE MG/DL
GLUCOSE SERPL-MCNC: 102 MG/DL
KETONES URINE: NEGATIVE MG/DL
LEUKOCYTE ESTERASE URINE: NEGATIVE
NITRITE URINE: NEGATIVE
PH URINE: 6.5
POTASSIUM SERPL-SCNC: 4.6 MMOL/L
PROTEIN URINE: 30 MG/DL
PSA SERPL-MCNC: 1.27 NG/ML
SODIUM SERPL-SCNC: 135 MMOL/L
SPECIFIC GRAVITY URINE: 1.01
UROBILINOGEN URINE: 1 MG/DL

## 2023-07-26 PROCEDURE — 99214 OFFICE O/P EST MOD 30 MIN: CPT

## 2023-07-26 NOTE — HISTORY OF PRESENT ILLNESS
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 He grew E Coli at the time and On pelvic US had a 40-50 gm gland.  He was not retaining and did not need a catheter. Has had this before.  There were no fevers, but chills and he had significant LUTS.  WHen asked about DM, he said in past he was told he was borderline.\par \par Father  in his 40's and pt is unsure from what.\par \par Now w no sx's but a f/u PSA was still high for age on 25: 3.14\par \par Currently, voiding well with no dysuria, Gross hematuria or difficulty.\par There are normal bowels with no BRBPR and no blood in ejaculate.\par Denies cloudy urine and has no known prior h/o stones.\par \par

## 2023-07-26 NOTE — ASSESSMENT
[FreeTextEntry1] : Reviewed CT scan with patient and pelvic US in detail.\par \par Will need further evaluation for complex cyst in the renal isthmus of horseshoe kidney.\par \par PSA drawn today.\par \par Needs PCP to f/u borderline DM and also his "fatty liver" on imaging.\par \par Network will reach out.\par \par Explained age ranges of PSA by race etc. and that "4" is a very random number. He is young and if PSA still around 3, I will be getting an MRI to assess gland for any PIRAD lesion which was also described.\par \par He also understands that if PSA density is high, will need BX of gland, even if no lesion.\par Timed voiding reviewed: should void q 4 hrs or more often even if no great urge.\par \par 34 min spent

## 2023-07-26 NOTE — HISTORY OF PRESENT ILLNESS
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 He grew E Coli at the time and On pelvic US had a 40-50 gm gland.  He was not retaining and did not need a catheter. Has had this before.  There were no fevers, but chills and he had significant LUTS.  WHen asked about DM, he said in past he was told he was borderline.\par \par Father  in his 40's and pt is unsure from what.\par \par Now w no sx's but a f/u PSA was still high for age on 25: 3.14\par \par Currently, voiding well with no dysuria, Gross hematuria or difficulty.\par There are normal bowels with no BRBPR and no blood in ejaculate.\par Denies cloudy urine and has no known prior h/o stones.\par \par

## 2023-09-27 ENCOUNTER — APPOINTMENT (OUTPATIENT)
Dept: ULTRASOUND IMAGING | Facility: IMAGING CENTER | Age: 43
End: 2023-09-27
Payer: COMMERCIAL

## 2023-09-27 ENCOUNTER — OUTPATIENT (OUTPATIENT)
Dept: OUTPATIENT SERVICES | Facility: HOSPITAL | Age: 43
LOS: 1 days | End: 2023-09-27
Payer: COMMERCIAL

## 2023-09-27 DIAGNOSIS — Z98.890 OTHER SPECIFIED POSTPROCEDURAL STATES: Chronic | ICD-10-CM

## 2023-09-27 DIAGNOSIS — Q63.1 LOBULATED, FUSED AND HORSESHOE KIDNEY: ICD-10-CM

## 2023-09-27 PROCEDURE — 76770 US EXAM ABDO BACK WALL COMP: CPT | Mod: 26

## 2023-09-27 PROCEDURE — 76770 US EXAM ABDO BACK WALL COMP: CPT

## 2023-12-28 ENCOUNTER — NON-APPOINTMENT (OUTPATIENT)
Age: 43
End: 2023-12-28

## 2024-04-24 ENCOUNTER — NON-APPOINTMENT (OUTPATIENT)
Age: 44
End: 2024-04-24

## 2024-05-13 ENCOUNTER — APPOINTMENT (OUTPATIENT)
Dept: AFTER HOURS CARE | Facility: EMERGENCY ROOM | Age: 44
End: 2024-05-13
Payer: COMMERCIAL

## 2024-05-13 PROCEDURE — 99203 OFFICE O/P NEW LOW 30 MIN: CPT

## 2024-05-13 NOTE — PHYSICAL EXAM
[de-identified] : PLEASE SEE HPI FOR ADDITIONAL RELEVANT PHYSICAL EXAM FINDINGS GENERAL: no acute distress, nontoxic HEAD: normocephalic EYES: no scleral icterus NECK: trachea midline, Full ROM RESP: no respiratory distress ABD: nondistended MSK: no gross deformity NEURO: alert & fully oriented SKIN: no rash PSYCH: cooperative, good insight, appropriate, fluent speech  [de-identified] : No restriction to ROM of R shoulder or elbow.  No TTP with guided palpation of inner upper arm.  Unable to reproduce pain with resisted shoulder flexion or internal rotation.

## 2024-05-13 NOTE — HISTORY OF PRESENT ILLNESS
[Home] : at home, [unfilled] , at the time of the visit. [Other Location: e.g. Home (Enter Location, City,State)___] : at [unfilled] [Verbal consent obtained from patient] : the patient, [unfilled] [FreeTextEntry8] : 43 y M R pec/shoulder pain and weakness ~1 wk after bowling.  Recalls throwing the ball, tried to put spin on it, and feeling pain on medial R upper arm.  No radiation, no paresthesias, no weakness.  No other injuries.  Overall all sypmtoms are improving, pain has resolved, and he has no new symptoms.  He states that he has very occasional sharp medial upper arm pain proximal to the elbow that is sharp, associated with certain movements, but otherwise minimal.  Has not taken any medications for symptoms.  No other complaints.  He asking about utiilty of further workup for potential msk strain/tear.

## 2024-05-13 NOTE — ASSESSMENT
[FreeTextEntry1] : 43 y M R arm injury 1 wk ago, distal upper arm, improving, unable to reproduce symptoms currently.  No gross weakness on exam currently.  No stated parethesias.  No restricted ROM.  Likely bicep strain, less likely high grade tear/rupture.  No indication for referral to ED.  No concern for atypical CV etiology.  No need for immobilization at this time but recommended to pt that he should abstain from heavy lifting and resistance exercise until he is pain free.  Referral to sports medicine provided.

## 2024-05-22 DIAGNOSIS — Z00.00 ENCOUNTER FOR GENERAL ADULT MEDICAL EXAMINATION W/OUT ABNORMAL FINDINGS: ICD-10-CM

## 2024-05-22 DIAGNOSIS — R97.20 ELEVATED PROSTATE, SPECIFIC ANTIGEN [PSA]: ICD-10-CM

## 2024-05-22 DIAGNOSIS — R31.29 OTHER MICROSCOPIC HEMATURIA: ICD-10-CM

## 2024-05-24 ENCOUNTER — APPOINTMENT (OUTPATIENT)
Dept: UROLOGY | Facility: CLINIC | Age: 44
End: 2024-05-24

## 2024-06-07 ENCOUNTER — NON-APPOINTMENT (OUTPATIENT)
Age: 44
End: 2024-06-07

## 2024-06-10 ENCOUNTER — NON-APPOINTMENT (OUTPATIENT)
Age: 44
End: 2024-06-10

## 2024-06-10 ENCOUNTER — APPOINTMENT (OUTPATIENT)
Dept: ORTHOPEDIC SURGERY | Facility: CLINIC | Age: 44
End: 2024-06-10
Payer: COMMERCIAL

## 2024-06-10 VITALS — BODY MASS INDEX: 25.05 KG/M2 | HEIGHT: 70 IN | WEIGHT: 175 LBS

## 2024-06-10 DIAGNOSIS — S46.211A STRAIN OF MUSCLE, FASCIA AND TENDON OF OTHER PARTS OF BICEPS, RIGHT ARM, INITIAL ENCOUNTER: ICD-10-CM

## 2024-06-10 PROCEDURE — 73030 X-RAY EXAM OF SHOULDER: CPT | Mod: RT

## 2024-06-10 PROCEDURE — 99204 OFFICE O/P NEW MOD 45 MIN: CPT

## 2024-06-10 NOTE — HISTORY OF PRESENT ILLNESS
[de-identified] : 43 year old RHD male presents complaining of right biceps pain that started a month ago. He notes he went bowling and felt a sharp pull after throwing the ball. He denies hearing a pop. He notes he went to  the suitcase two weeks later and that aggravated the right biceps pain. He notes his right biceps did not get black and blue. He denies neck pain. Patient denies that he has any numbness or tingling. He notes he does a lot of heavy lifting at work. He notes he is overcompensating a little bit at work. He notes he is very cautious at work at this time. PHMx: HTN

## 2024-06-10 NOTE — PHYSICAL EXAM
[de-identified] : Constitutional o Appearance : well-nourished, well developed, alert, in no acute distress  Head and Face o Head :  Inspection : atraumatic, normocephalic o Face :  Inspection : no visible rash or discoloration Respiratory o Respiratory Effort: breathing unlabored  Neurologic o Sensation : Normal sensation  Psychiatric o Mood and Affect: mood normal, affect appropriate  Lymphatic o Additional Nodes : No palpable lymph nodes present   Cervical Spine o Inspection/Palpation :  Inspection : alignment midline, normal degree of lordosis present  Skin : normal appearance, no masses or tenderness, trachea midline  Palpation : musculature is nontender to palpation, no paracervical or parascapular tenderness  o Range of Motion : full ROM,  o Tests: Negative Spurlings test  Right Upper Extremity o Right Shoulder :  Inspection/Palpation : no anterior capsular tenderness, no biceps tenderness no swelling or deformities, biceps located higher   Range of Motion : full and painless in all planes, no crepitance  Strength : forward elevation 5/5, IR 5/5, ER 5/5, ER at 90 of abduction 5/5, supraspinatus 5/5, adduction 5/5, abduction 5/5, biceps/triceps 5/5,  5/5  Stability : no joint instability on provocative testing   Tests: Stephens negative, Neer negative, Charly negative, drop arm test negative, Steele's test negative  Left Upper Extremity o Left Shoulder :  Inspection/Palpation : no tenderness, no swelling or deformities  Range of Motion : full and painless in all planes, no crepitance  Strength : forward elevation 5/5, IR 5/5, ER 5/5, ER at 90 of abduction 5/5, supraspinatus 5/5, adduction 5/5, abduction 5/5, biceps/triceps 5/5,  5/5  Stability : no joint instability on provocative testing   Tests: Stephens negative, Neer negative, Charly negative, drop arm test negative, Steele's test negative  Gait and Station: Gait: gait normal, no significant extremity swelling or lymphedema, good proprioception and balance  Radiology Results 6/10/2024 o Right Shoulder : AP internal/external rotation and outlet views were obtained and revealed sclerosis of the greater tuberosity, and mild degenerative arthritis of the right shoulder

## 2024-06-10 NOTE — ADDENDUM
[FreeTextEntry1] : I, RASHAAD MARSHALL, acted solely as a scribe for Dr. Dave Juarez on this date 06/10/2024.  All medical record entries made by the Scribe were at my, Dr. Dave Juarez, direction and personally dictated by me on 06/10/2024. I have reviewed the chart and agree that the record accurately reflects my personal performance of the history, physical exam, assessment and plan. I have also personally directed, reviewed, and agreed with the chart.

## 2024-06-10 NOTE — DISCUSSION/SUMMARY
[de-identified] : I went over the pathophysiology of the patient's symptoms in great detail with the patient. I discussed the underlying pathophysiology of the patient's condition in great detail with the patient. I went over the patient's x-rays with them in great detail. He should avoid lifting anything heavy with the right hand . We are requesting authorization for an MRI of the patients' right biceps and elbow to rule out partial biceps tear.   All of their questions were answered. They understand and consent to the plan.   FU after MRI.

## 2024-06-20 ENCOUNTER — APPOINTMENT (OUTPATIENT)
Dept: UROLOGY | Facility: CLINIC | Age: 44
End: 2024-06-20

## 2024-06-21 ENCOUNTER — OUTPATIENT (OUTPATIENT)
Dept: OUTPATIENT SERVICES | Facility: HOSPITAL | Age: 44
LOS: 1 days | End: 2024-06-21

## 2024-06-21 DIAGNOSIS — Z00.8 ENCOUNTER FOR OTHER GENERAL EXAMINATION: ICD-10-CM

## 2024-06-21 DIAGNOSIS — Z98.890 OTHER SPECIFIED POSTPROCEDURAL STATES: Chronic | ICD-10-CM

## 2024-06-24 ENCOUNTER — OUTPATIENT (OUTPATIENT)
Dept: OUTPATIENT SERVICES | Facility: HOSPITAL | Age: 44
LOS: 1 days | End: 2024-06-24
Payer: COMMERCIAL

## 2024-06-24 ENCOUNTER — APPOINTMENT (OUTPATIENT)
Dept: MRI IMAGING | Facility: CLINIC | Age: 44
End: 2024-06-24
Payer: COMMERCIAL

## 2024-06-24 DIAGNOSIS — S46.211A STRAIN OF MUSCLE, FASCIA AND TENDON OF OTHER PARTS OF BICEPS, RIGHT ARM, INITIAL ENCOUNTER: ICD-10-CM

## 2024-06-24 DIAGNOSIS — Z98.890 OTHER SPECIFIED POSTPROCEDURAL STATES: Chronic | ICD-10-CM

## 2024-06-24 DIAGNOSIS — Z00.8 ENCOUNTER FOR OTHER GENERAL EXAMINATION: ICD-10-CM

## 2024-06-24 PROCEDURE — 73221 MRI JOINT UPR EXTREM W/O DYE: CPT | Mod: 26,RT

## 2024-06-24 PROCEDURE — 73221 MRI JOINT UPR EXTREM W/O DYE: CPT

## 2024-07-02 NOTE — ED ADULT TRIAGE NOTE - BMI (KG/M2)
Patient is scheduled for a colonoscopy with Dr Morgan on 10/4/24. Please send Nulytely prep to patient's selected pharmacy selected during scheduling.       Thank you, GI [facility]read[facility] Surgery Scheduler  25.8

## 2024-07-11 ENCOUNTER — TRANSCRIPTION ENCOUNTER (OUTPATIENT)
Age: 44
End: 2024-07-11

## 2024-07-24 ENCOUNTER — APPOINTMENT (OUTPATIENT)
Dept: ORTHOPEDIC SURGERY | Facility: CLINIC | Age: 44
End: 2024-07-24
Payer: COMMERCIAL

## 2024-07-24 VITALS — BODY MASS INDEX: 25.05 KG/M2 | WEIGHT: 175 LBS | HEIGHT: 70 IN

## 2024-07-24 PROCEDURE — 99214 OFFICE O/P EST MOD 30 MIN: CPT

## 2024-07-24 NOTE — DISCUSSION/SUMMARY
[de-identified] : I went over the pathophysiology of the patient's symptoms in great detail with the patient. I went over the patient's MRI results with them in great detail and used models to aid in my explanation. I informed the patient that surgery will be required to provide them long term relief from their symptoms. The proper pre and post operative procedures and expectations were discussed in extensive detail with the patient. We had a lengthy discussion about the patient's issues, and talked about the benefits and risks of the procedure. We provided the name of Dr. Isaacs.   All of their questions were answered. They understand and consent to the plan.   FU with Dr. Isaacs.

## 2024-07-24 NOTE — HISTORY OF PRESENT ILLNESS
[de-identified] : 43 year old RHD male presents for a right elbow MRI review today. I went over the patient's MRI results with them in great detail and used models to aid in my explanation. He notes he does not have pain of his right elbow if he does not move it. Patient denies that he has any numbness or tingling. PHMx: HTN the patient does heavy lifting as a .  He believes that this may have happened a few months ago.  [facility] IMPRESSION: 1.  Complete tear of the biceps tendon with retraction roughly 10.5 cm with a background of severe tendinosis of the retracted fibers. There is moderate edema within the biceps aponeurosis. 2.  Moderate common extensor tendinosis with low-grade interstitial tearing at the origin. 3.  Moderate common flexor/pronator tendon origin tendinosis with low-grade interstitial tearing at the origin.  --- End of Report ---  Radiology Results 6/10/2024 o Right Shoulder : AP internal/external rotation and outlet views were obtained and revealed sclerosis of the greater tuberosity, and mild degenerative arthritis of the right shoulder

## 2024-07-24 NOTE — ADDENDUM
[FreeTextEntry1] : I, RASHAAD MARSHALL, acted solely as a scribe for Dr. Dave Juarez on this date 07/24/2024.  All medical record entries made by the Scribe were at my, Dr. Dave Juarez, direction and personally dictated by me on 07/24/2024. I have reviewed the chart and agree that the record accurately reflects my personal performance of the history, physical exam, assessment and plan. I have also personally directed, reviewed, and agreed with the chart.

## 2024-07-24 NOTE — PHYSICAL EXAM
[de-identified] : Constitutional o Appearance : well-nourished, well developed, alert, in no acute distress  Head and Face o Head :  Inspection : atraumatic, normocephalic o Face :  Inspection : no visible rash or discoloration Respiratory o Respiratory Effort: breathing unlabored  Neurologic o Sensation : Normal sensation  Psychiatric o Mood and Affect: mood normal, affect appropriate  Lymphatic o Additional Nodes : No palpable lymph nodes present   Cervical Spine o Inspection/Palpation :  Inspection : alignment midline, normal degree of lordosis present  Skin : normal appearance, no masses or tenderness, trachea midline  Palpation : musculature is nontender to palpation, no paracervical or parascapular tenderness  o Range of Motion : full ROM,  o Tests: Negative Spurlings test  Right Upper Extremity o Right Shoulder :  Inspection/Palpation : no anterior capsular tenderness, no biceps tenderness no swelling or deformities, biceps located higher   Range of Motion : full and painless in all planes, no crepitance  Strength : forward elevation 5/5, IR 5/5, ER 5/5, ER at 90 of abduction 5/5, supraspinatus 5/5, adduction 5/5, abduction 5/5, biceps/triceps 5/5,  5/5  Stability : no joint instability on provocative testing   Tests: Stephens negative, Neer negative, Charly negative, drop arm test negative, Highland's test negative  Left Upper Extremity o Left Shoulder :  Inspection/Palpation : no tenderness, no swelling or deformities  Range of Motion : full and painless in all planes, no crepitance  Strength : forward elevation 5/5, IR 5/5, ER 5/5, ER at 90 of abduction 5/5, supraspinatus 5/5, adduction 5/5, abduction 5/5, biceps/triceps 5/5,  5/5  Stability : no joint instability on provocative testing   Tests: Stephens negative, Neer negative, Charly negative, drop arm test negative, Highland's test negative  Gait and Station: Gait: gait normal, no significant extremity swelling or lymphedema, good proprioception and balance

## 2024-07-29 ENCOUNTER — APPOINTMENT (OUTPATIENT)
Dept: UROLOGY | Facility: CLINIC | Age: 44
End: 2024-07-29

## 2024-07-29 ENCOUNTER — APPOINTMENT (OUTPATIENT)
Dept: ORTHOPEDIC SURGERY | Facility: CLINIC | Age: 44
End: 2024-07-29
Payer: COMMERCIAL

## 2024-07-29 DIAGNOSIS — S46.219A STRAIN OF MUSCLE, FASCIA AND TENDON OF OTHER PARTS OF BICEPS, UNSPECIFIED ARM, INITIAL ENCOUNTER: ICD-10-CM

## 2024-07-29 PROCEDURE — 99203 OFFICE O/P NEW LOW 30 MIN: CPT

## 2024-07-29 PROCEDURE — 99213 OFFICE O/P EST LOW 20 MIN: CPT

## 2024-07-30 NOTE — PHYSICAL EXAM
[de-identified] : Patient is WDWN, alert, and in no acute distress. Breathing is unlabored. He is grossly oriented to person, place, and time.   Right elbow: No discoloration, mild edema Proximally retracted biceps muscle belly Tenderness to palpation ROM  R hand FROM Normal sensation [de-identified] : MRI Evaluation of the Right Elbow performed on 06/24/2024 shows Complete tear of the biceps tendon with retraction roughly 10.5 cm with a background of severe tendinosis of the retracted fibers. There is moderate edema within the biceps aponeurosis. 2.  Moderate common extensor tendinosis with low-grade interstitial tearing at the origin. 3.  Moderate common flexor/pronator tendon origin tendinosis with low-grade interstitial tearing at the origin.

## 2024-07-30 NOTE — HISTORY OF PRESENT ILLNESS
[de-identified] : Pt is a 44 y/o male with right biceps tendon tear.  He states that he felt pain while bowling approximately 2 months ago. The patient states that initially he did not have significant symptoms, but a month later, he experienced pain, swelling and deformity after  lifting up a suitcase.  He did notice that he had a deformity of his biceps.  He had an MRI  which showed a complete tear of the biceps with retraction.

## 2024-07-30 NOTE — ADDENDUM
[FreeTextEntry1] : I, Umer Carrillo Jr, acted solely as a scribe for Dr. Loi Isaacs on this date 07/29/2024  All medical record entries made by the Scribe were at my, Dr. Loi Isaacs, direction and personally dictated by me on 07/29/2024 . I have reviewed the chart and agree that the record accurately reflects my personal performance of the history, physical exam, assessment and plan. I have also personally directed, reviewed, and agreed with the chart.

## 2024-07-30 NOTE — HISTORY OF PRESENT ILLNESS
[de-identified] : Pt is a 42 y/o male with right biceps tendon tear.  He states that he felt pain while bowling approximately 2 months ago. The patient states that initially he did not have significant symptoms, but a month later, he experienced pain, swelling and deformity after  lifting up a suitcase.  He did notice that he had a deformity of his biceps.  He had an MRI  which showed a complete tear of the biceps with retraction.

## 2024-07-30 NOTE — PHYSICAL EXAM
[de-identified] : Patient is WDWN, alert, and in no acute distress. Breathing is unlabored. He is grossly oriented to person, place, and time.   Right elbow: No discoloration, mild edema Proximally retracted biceps muscle belly Tenderness to palpation ROM  R hand FROM Normal sensation [de-identified] : MRI Evaluation of the Right Elbow performed on 06/24/2024 shows Complete tear of the biceps tendon with retraction roughly 10.5 cm with a background of severe tendinosis of the retracted fibers. There is moderate edema within the biceps aponeurosis. 2.  Moderate common extensor tendinosis with low-grade interstitial tearing at the origin. 3.  Moderate common flexor/pronator tendon origin tendinosis with low-grade interstitial tearing at the origin.

## 2024-07-30 NOTE — DISCUSSION/SUMMARY
[de-identified] : The underlying pathophysiology was reviewed with the patient. MRI films were reviewed with the patient. Discussed at length the nature of the patient's condition. Their right elbow symptoms appear secondary to right biceps tendon tear. The patient wishes to proceed with surgery at this time. The risks and benefits were reviewed with the patient.   At this time, I do believe the patient would benefit from surgery from his right elbow. I am referring the patient to Dr. iLng for biceps tendon repair with possible tendon graft.  All questions answered, understanding verbalized. Patient in agreement with plan of care.

## 2024-07-30 NOTE — DISCUSSION/SUMMARY
[de-identified] : The underlying pathophysiology was reviewed with the patient. MRI films were reviewed with the patient. Discussed at length the nature of the patient's condition. Their right elbow symptoms appear secondary to right biceps tendon tear. The patient wishes to proceed with surgery at this time. The risks and benefits were reviewed with the patient.   At this time, I do believe the patient would benefit from surgery from his right elbow. I am referring the patient to Dr. Ling for biceps tendon repair with possible tendon graft.  All questions answered, understanding verbalized. Patient in agreement with plan of care.

## 2024-07-31 ENCOUNTER — APPOINTMENT (OUTPATIENT)
Dept: ORTHOPEDIC SURGERY | Facility: CLINIC | Age: 44
End: 2024-07-31
Payer: COMMERCIAL

## 2024-07-31 ENCOUNTER — NON-APPOINTMENT (OUTPATIENT)
Age: 44
End: 2024-07-31

## 2024-07-31 DIAGNOSIS — S46.211A STRAIN OF MUSCLE, FASCIA AND TENDON OF OTHER PARTS OF BICEPS, RIGHT ARM, INITIAL ENCOUNTER: ICD-10-CM

## 2024-07-31 PROCEDURE — 99214 OFFICE O/P EST MOD 30 MIN: CPT

## 2024-07-31 NOTE — PHYSICAL EXAM
[de-identified] :  Right Upper Extremity o Elbow :  Inspection/Palpation : mild tenderness over the antecubital fossa, no swelling, distal bicep deformity and palpable tendon stump in the distal upper arm  Range of Motion : measured 0-140 degrees, no crepitus, 90/90 pronation/ supination  Strength : flexion and extension 5/5,  4/5 supination strength  Stability : no joint instability on provocative testing Additional tests: (+) hook sign o Muscle Bulk : no atrophy o Sensation : sensation intact to light touch o Skin : no skin lesions, no discoloration o Vascular Exam : no edema, no cyanosis, radial and ulnar pulses normal   [de-identified] : Patient comes to today's visit with outside imaging already performed. I reviewed the images in detail with the patient and discussed the findings as highlighted below.  XAM: [id number] - MR ELBOW RT - ORDERED BY: ERLINDA GUDINO   PROCEDURE DATE: 06/24/2024    INTERPRETATION: Exam Type: MR ELBOW RIGHT Exam Date and Time: 6/24/2024 4:23 PM Indication: Right biceps pain. Comparison: No relevant prior studies available for comparison.  TECHNIQUE: Multiplanar multisequence MRI of the right elbow was performed.  FINDINGS: BONES/JOINTS: No acute fracture or bone bruise. Small joint effusion. No full-thickness cartilage defects. No osteochondral lesion. Alignment is anatomic.  LIGAMENTS: Ulnar collateral ligament (UCL): Intact. Radial collateral ligament (RCL): Intact. Lateral ulnar collateral ligament (LUCL): Intact. Annular ligament: Intact.  TENDONS: Common extensor tendon origin: Moderate common extensor tendinosis with low-grade interstitial tearing at the origin. Common flexor/pronator tendon origin: Moderate common flexor/pronator tendon origin tendinosis with low-grade interstitial tearing at the origin. Biceps tendon: Complete tear of the biceps tendon with retraction roughly 10.5 cm with a background of severe tendinosis of the retracted fibers. There is moderate edema within the biceps aponeurosis. Brachialis tendon: No tendinosis or tear. Triceps tendon: No tendinosis or tear.  CUBITAL TUNNEL: Ulnar nerve is normal in location, morphology, and signal intensity. No mass or mass effect in the cubital tunnel.  MUSCLES: Mild edema within the biceps muscle belly.  SOFT TISSUES: Subcutaneous edema around the biceps tendon. No fluid collection or soft tissue mass.  IMPRESSION: 1. Complete tear of the biceps tendon with retraction roughly 10.5 cm with a background of severe tendinosis of the retracted fibers. There is moderate edema within the biceps aponeurosis. 2. Moderate common extensor tendinosis with low-grade interstitial tearing at the origin. 3. Moderate common flexor/pronator tendon origin tendinosis with low-grade interstitial tearing at the origin.  --- End of Report ---      LISA DAVIES MD; Resident Radiologist This document has been electronically signed. TOMER TORRES DO; Attending Radiologist This document has been electronically signed. Jun 26 2024 2:15PM ______________________________

## 2024-07-31 NOTE — HISTORY OF PRESENT ILLNESS
[de-identified] : LANG DANIEL is a 43 year old RHD male presenting to the office complaining of RIGHT elbow pain. He states that he felt pain while bowling approximately 2 months ago. The patient states that initially he did not have significant symptoms, but a month later, he experienced pain, swelling and deformity after lifting up a suitcase. He did notice that he had a deformity of his biceps. The patient describes the pain as a dull aching, and occasionally sharp pain localized to the anterior aspect of his RIGHT elbow that is intermittent in nature. His symptoms are exacerbated with any movement of the elbow and gripping of the hand. Patient reports associated weakness. Denies numbness and tingling in the upper extremity. Patient saw Dr. Isaacs who referred him here. He had an MRI which showed a complete tear of the biceps with retraction. Patients works in [employer].

## 2024-07-31 NOTE — HISTORY OF PRESENT ILLNESS
[de-identified] : LANG DANIEL is a 43 year old RHD male presenting to the office complaining of RIGHT elbow pain. He states that he felt pain while bowling approximately 2 months ago. The patient states that initially he did not have significant symptoms, but a month later, he experienced pain, swelling and deformity after lifting up a suitcase. He did notice that he had a deformity of his biceps. The patient describes the pain as a dull aching, and occasionally sharp pain localized to the anterior aspect of his RIGHT elbow that is intermittent in nature. His symptoms are exacerbated with any movement of the elbow and gripping of the hand. Patient reports associated weakness. Denies numbness and tingling in the upper extremity. Patient saw Dr. Isaacs who referred him here. He had an MRI which showed a complete tear of the biceps with retraction. Patients works in [employer].

## 2024-07-31 NOTE — DISCUSSION/SUMMARY
[de-identified] : The underlying pathophysiology was reviewed in great detail with the patient as well as the various treatment options, including ice, analgesics, NSAIDs, Physical therapy, and surgery.  I explained that due to degree of retraction and the chronicity of the tear it may not be possible to perform a primary repair and that he may require allograft reconstruction.   An MRI of the RIGHT elbow was discussed in great detail with the patient today. Patient with complete tear of the biceps tendon with retraction roughly 10.5 cm with a background of severe tendinosis of the retracted fibers. There is moderate edema within the biceps aponeurosis, moderate common extensor tendinosis with low-grade interstitial tearing at the origin, moderate common flexor/pronator tendon origin tendinosis with low-grade interstitial tearing at the origin.  I advised surgical intervention as soon as possible. The patient wishes to proceed with SURGICAL INTERVENTION at this time. The risks and benefits of a BICEP TENDON REPAIR AND POSSIBLE RECONSTRUCTION WITH AN ALLO-GRAFT were discussed in great detail today, including but not limited to bleeding, infection, nerve injury (PIN and LABC), DVT, allergy to the anesthetic or to the implants, persistent pain, stiffness, scarring, swelling or deformity.  I issued a letter for work, noting that he will be out of work for 6 months post surgery. 	  FU at time of surgery.  All questions were answered, all alternatives discussed and the patient is in complete agreement with that plan. Follow-up appointment as instructed. Any issues and the patient will call or come in sooner.

## 2024-07-31 NOTE — ADDENDUM
[FreeTextEntry1] :   I, Maile Hinds, acted solely as a scribe for Dr. Semaj Griffith MD on this date 07/31/2024   All medical record entries made by the Scribe were at my, Dr. Semaj Griffith MD, direction and personally dictated by me on 07/31/2024 .  I have reviewed the chart and agree that the record accurately reflects my personal performance of the history, physical exam, assessment and plan. I have also personally directed, reviewed, and agreed with the chart.

## 2024-07-31 NOTE — DISCUSSION/SUMMARY
[de-identified] : The underlying pathophysiology was reviewed in great detail with the patient as well as the various treatment options, including ice, analgesics, NSAIDs, Physical therapy, and surgery.  I explained that due to degree of retraction and the chronicity of the tear it may not be possible to perform a primary repair and that he may require allograft reconstruction.   An MRI of the RIGHT elbow was discussed in great detail with the patient today. Patient with complete tear of the biceps tendon with retraction roughly 10.5 cm with a background of severe tendinosis of the retracted fibers. There is moderate edema within the biceps aponeurosis, moderate common extensor tendinosis with low-grade interstitial tearing at the origin, moderate common flexor/pronator tendon origin tendinosis with low-grade interstitial tearing at the origin.  I advised surgical intervention as soon as possible. The patient wishes to proceed with SURGICAL INTERVENTION at this time. The risks and benefits of a BICEP TENDON REPAIR AND POSSIBLE RECONSTRUCTION WITH AN ALLO-GRAFT were discussed in great detail today, including but not limited to bleeding, infection, nerve injury (PIN and LABC), DVT, allergy to the anesthetic or to the implants, persistent pain, stiffness, scarring, swelling or deformity.  I issued a letter for work, noting that he will be out of work for 6 months post surgery. 	  FU at time of surgery.  All questions were answered, all alternatives discussed and the patient is in complete agreement with that plan. Follow-up appointment as instructed. Any issues and the patient will call or come in sooner.

## 2024-07-31 NOTE — PHYSICAL EXAM
[de-identified] :  Right Upper Extremity o Elbow :  Inspection/Palpation : mild tenderness over the antecubital fossa, no swelling, distal bicep deformity and palpable tendon stump in the distal upper arm  Range of Motion : measured 0-140 degrees, no crepitus, 90/90 pronation/ supination  Strength : flexion and extension 5/5,  4/5 supination strength  Stability : no joint instability on provocative testing Additional tests: (+) hook sign o Muscle Bulk : no atrophy o Sensation : sensation intact to light touch o Skin : no skin lesions, no discoloration o Vascular Exam : no edema, no cyanosis, radial and ulnar pulses normal   [de-identified] : Patient comes to today's visit with outside imaging already performed. I reviewed the images in detail with the patient and discussed the findings as highlighted below.  XAM: [id number] - MR ELBOW RT - ORDERED BY: ERLINDA GUDINO   PROCEDURE DATE: 06/24/2024    INTERPRETATION: Exam Type: MR ELBOW RIGHT Exam Date and Time: 6/24/2024 4:23 PM Indication: Right biceps pain. Comparison: No relevant prior studies available for comparison.  TECHNIQUE: Multiplanar multisequence MRI of the right elbow was performed.  FINDINGS: BONES/JOINTS: No acute fracture or bone bruise. Small joint effusion. No full-thickness cartilage defects. No osteochondral lesion. Alignment is anatomic.  LIGAMENTS: Ulnar collateral ligament (UCL): Intact. Radial collateral ligament (RCL): Intact. Lateral ulnar collateral ligament (LUCL): Intact. Annular ligament: Intact.  TENDONS: Common extensor tendon origin: Moderate common extensor tendinosis with low-grade interstitial tearing at the origin. Common flexor/pronator tendon origin: Moderate common flexor/pronator tendon origin tendinosis with low-grade interstitial tearing at the origin. Biceps tendon: Complete tear of the biceps tendon with retraction roughly 10.5 cm with a background of severe tendinosis of the retracted fibers. There is moderate edema within the biceps aponeurosis. Brachialis tendon: No tendinosis or tear. Triceps tendon: No tendinosis or tear.  CUBITAL TUNNEL: Ulnar nerve is normal in location, morphology, and signal intensity. No mass or mass effect in the cubital tunnel.  MUSCLES: Mild edema within the biceps muscle belly.  SOFT TISSUES: Subcutaneous edema around the biceps tendon. No fluid collection or soft tissue mass.  IMPRESSION: 1. Complete tear of the biceps tendon with retraction roughly 10.5 cm with a background of severe tendinosis of the retracted fibers. There is moderate edema within the biceps aponeurosis. 2. Moderate common extensor tendinosis with low-grade interstitial tearing at the origin. 3. Moderate common flexor/pronator tendon origin tendinosis with low-grade interstitial tearing at the origin.  --- End of Report ---      LISA DAVIES MD; Resident Radiologist This document has been electronically signed. TOMER TORRES DO; Attending Radiologist This document has been electronically signed. Jun 26 2024 2:15PM ______________________________

## 2024-08-02 ENCOUNTER — OUTPATIENT (OUTPATIENT)
Dept: OUTPATIENT SERVICES | Facility: HOSPITAL | Age: 44
LOS: 1 days | End: 2024-08-02
Payer: COMMERCIAL

## 2024-08-02 VITALS
HEART RATE: 90 BPM | DIASTOLIC BLOOD PRESSURE: 98 MMHG | RESPIRATION RATE: 16 BRPM | SYSTOLIC BLOOD PRESSURE: 160 MMHG | OXYGEN SATURATION: 100 % | WEIGHT: 181 LBS | TEMPERATURE: 98 F | HEIGHT: 70 IN

## 2024-08-02 DIAGNOSIS — S46.219A STRAIN OF MUSCLE, FASCIA AND TENDON OF OTHER PARTS OF BICEPS, UNSPECIFIED ARM, INITIAL ENCOUNTER: ICD-10-CM

## 2024-08-02 DIAGNOSIS — Z98.890 OTHER SPECIFIED POSTPROCEDURAL STATES: Chronic | ICD-10-CM

## 2024-08-02 DIAGNOSIS — N44.00 TORSION OF TESTIS, UNSPECIFIED: Chronic | ICD-10-CM

## 2024-08-02 DIAGNOSIS — Z01.818 ENCOUNTER FOR OTHER PREPROCEDURAL EXAMINATION: ICD-10-CM

## 2024-08-02 DIAGNOSIS — S46.211A STRAIN OF MUSCLE, FASCIA AND TENDON OF OTHER PARTS OF BICEPS, RIGHT ARM, INITIAL ENCOUNTER: ICD-10-CM

## 2024-08-02 DIAGNOSIS — R03.0 ELEVATED BLOOD-PRESSURE READING, WITHOUT DIAGNOSIS OF HYPERTENSION: ICD-10-CM

## 2024-08-02 LAB
ALBUMIN SERPL ELPH-MCNC: 4.1 G/DL — SIGNIFICANT CHANGE UP (ref 3.3–5)
ALP SERPL-CCNC: 48 U/L — SIGNIFICANT CHANGE UP (ref 30–120)
ALT FLD-CCNC: 60 U/L — SIGNIFICANT CHANGE UP (ref 10–60)
ANION GAP SERPL CALC-SCNC: 7 MMOL/L — SIGNIFICANT CHANGE UP (ref 5–17)
AST SERPL-CCNC: 20 U/L — SIGNIFICANT CHANGE UP (ref 10–40)
BILIRUB SERPL-MCNC: 0.3 MG/DL — SIGNIFICANT CHANGE UP (ref 0.2–1.2)
BUN SERPL-MCNC: 8 MG/DL — SIGNIFICANT CHANGE UP (ref 7–23)
CALCIUM SERPL-MCNC: 9.5 MG/DL — SIGNIFICANT CHANGE UP (ref 8.4–10.5)
CHLORIDE SERPL-SCNC: 101 MMOL/L — SIGNIFICANT CHANGE UP (ref 96–108)
CO2 SERPL-SCNC: 29 MMOL/L — SIGNIFICANT CHANGE UP (ref 22–31)
CREAT SERPL-MCNC: 0.98 MG/DL — SIGNIFICANT CHANGE UP (ref 0.5–1.3)
EGFR: 99 ML/MIN/1.73M2 — SIGNIFICANT CHANGE UP
GLUCOSE SERPL-MCNC: 107 MG/DL — HIGH (ref 70–99)
HCT VFR BLD CALC: 42.9 % — SIGNIFICANT CHANGE UP (ref 39–50)
HGB BLD-MCNC: 14.4 G/DL — SIGNIFICANT CHANGE UP (ref 13–17)
MCHC RBC-ENTMCNC: 30.8 PG — SIGNIFICANT CHANGE UP (ref 27–34)
MCHC RBC-ENTMCNC: 33.6 GM/DL — SIGNIFICANT CHANGE UP (ref 32–36)
MCV RBC AUTO: 91.9 FL — SIGNIFICANT CHANGE UP (ref 80–100)
NRBC # BLD: 0 /100 WBCS — SIGNIFICANT CHANGE UP (ref 0–0)
PLATELET # BLD AUTO: 359 K/UL — SIGNIFICANT CHANGE UP (ref 150–400)
POTASSIUM SERPL-MCNC: 4.2 MMOL/L — SIGNIFICANT CHANGE UP (ref 3.5–5.3)
POTASSIUM SERPL-SCNC: 4.2 MMOL/L — SIGNIFICANT CHANGE UP (ref 3.5–5.3)
PROT SERPL-MCNC: 8 G/DL — SIGNIFICANT CHANGE UP (ref 6–8.3)
RBC # BLD: 4.67 M/UL — SIGNIFICANT CHANGE UP (ref 4.2–5.8)
RBC # FLD: 14.7 % — HIGH (ref 10.3–14.5)
SODIUM SERPL-SCNC: 137 MMOL/L — SIGNIFICANT CHANGE UP (ref 135–145)
WBC # BLD: 7.63 K/UL — SIGNIFICANT CHANGE UP (ref 3.8–10.5)
WBC # FLD AUTO: 7.63 K/UL — SIGNIFICANT CHANGE UP (ref 3.8–10.5)

## 2024-08-02 PROCEDURE — 93010 ELECTROCARDIOGRAM REPORT: CPT

## 2024-08-02 PROCEDURE — G0463: CPT

## 2024-08-02 PROCEDURE — 85027 COMPLETE CBC AUTOMATED: CPT

## 2024-08-02 PROCEDURE — 93005 ELECTROCARDIOGRAM TRACING: CPT

## 2024-08-02 PROCEDURE — 36415 COLL VENOUS BLD VENIPUNCTURE: CPT

## 2024-08-02 PROCEDURE — 80053 COMPREHEN METABOLIC PANEL: CPT

## 2024-08-02 NOTE — H&P PST ADULT - MUSCULOSKELETAL
details… no joint swelling/no joint erythema/no joint warmth/no calf tenderness/no chest wall tenderness/extremities exam/decreased strength

## 2024-08-02 NOTE — H&P PST ADULT - PROBLEM SELECTOR PLAN 1
Patient states that he stopped taking prescribed amlodipine " a few days ago" and he does not follow with a PCP.  I called Clinical coordination to request an appointment for PCP and medical clearance.  He denies headache, dizziness, changes in vision, SOB.  He is instructed to hydrate and follow low salt diet and to report to urgent care/ER if symptoms manifest.

## 2024-08-02 NOTE — H&P PST ADULT - HISTORY OF PRESENT ILLNESS
44 yo male reports bowling injury to right biceps muscle 5/2024 and reinjury 6/2024 when he picked up a suitcase.  he states that he has weakness and pain with ROM activity.  He is scheduled for right distal tendon biceps tendon repair with possible reconstruction with achilles tendon allograft on 8/9/2024

## 2024-08-02 NOTE — H&P PST ADULT - PROBLEM SELECTOR PLAN 2
diagnostic testing performed  Medical clearance pending  Pre op instructions were reviewed  best wishes offered

## 2024-08-02 NOTE — H&P PST ADULT - NSANTHOSAYNRD_GEN_A_CORE
No. MYCHAL screening performed.  STOP BANG Legend: 0-2 = LOW Risk; 3-4 = INTERMEDIATE Risk; 5-8 = HIGH Risk

## 2024-08-02 NOTE — H&P PST ADULT - NSICDXPASTMEDICALHX_GEN_ALL_CORE_FT
PAST MEDICAL HISTORY:  Elevated blood pressure reading     HTN (hypertension)     Rupture of right distal biceps tendon

## 2024-08-02 NOTE — H&P PST ADULT - ASSESSMENT
44 yo male is scheduled for right distal biceps repair with possible reconstruction with achilles tendon allograft on 8/9/2024

## 2024-08-05 ENCOUNTER — APPOINTMENT (OUTPATIENT)
Dept: INTERNAL MEDICINE | Facility: CLINIC | Age: 44
End: 2024-08-05

## 2024-08-05 ENCOUNTER — OUTPATIENT (OUTPATIENT)
Dept: OUTPATIENT SERVICES | Facility: HOSPITAL | Age: 44
LOS: 1 days | End: 2024-08-05
Payer: COMMERCIAL

## 2024-08-05 DIAGNOSIS — Z00.00 ENCOUNTER FOR GENERAL ADULT MEDICAL EXAMINATION WITHOUT ABNORMAL FINDINGS: ICD-10-CM

## 2024-08-05 DIAGNOSIS — N44.00 TORSION OF TESTIS, UNSPECIFIED: Chronic | ICD-10-CM

## 2024-08-05 PROBLEM — R03.0 ELEVATED BLOOD-PRESSURE READING, WITHOUT DIAGNOSIS OF HYPERTENSION: Chronic | Status: ACTIVE | Noted: 2024-08-02

## 2024-08-05 PROBLEM — Z01.818 PREOPERATIVE CLEARANCE: Status: ACTIVE | Noted: 2024-08-05

## 2024-08-05 PROBLEM — S46.211A STRAIN OF MUSCLE, FASCIA AND TENDON OF OTHER PARTS OF BICEPS, RIGHT ARM, INITIAL ENCOUNTER: Chronic | Status: ACTIVE | Noted: 2024-08-02

## 2024-08-05 PROCEDURE — 99214 OFFICE O/P EST MOD 30 MIN: CPT | Mod: 25

## 2024-08-05 PROCEDURE — 36415 COLL VENOUS BLD VENIPUNCTURE: CPT

## 2024-08-05 PROCEDURE — G2211 COMPLEX E/M VISIT ADD ON: CPT | Mod: NC

## 2024-08-05 PROCEDURE — G0463: CPT

## 2024-08-06 PROBLEM — I10 HYPERTENSION: Status: ACTIVE | Noted: 2024-08-06

## 2024-08-06 NOTE — PLAN
[FreeTextEntry1] : Problem 2: HTN c/w amlodipine 10 qd medication refilled f/u in 3 months for annual preventative evaluation

## 2024-08-06 NOTE — END OF VISIT
[] : Resident [FreeTextEntry3] : I, Dr. Buster Tolentino, saw and evaluated this patient in the presence of the resident. I discussed the management with the resident. I reviewed the note and agree with the documented plan of care with the following confirmations/corrections/additions: None.

## 2024-08-06 NOTE — HISTORY OF PRESENT ILLNESS
[No Pertinent Cardiac History] : no history of aortic stenosis, atrial fibrillation, coronary artery disease, recent myocardial infarction, or implantable device/pacemaker [Smoker] : smoker [No Adverse Anesthesia Reaction] : no adverse anesthesia reaction in self or family member [Good (7-10 METs)] : Good (7-10 METs) [Asthma] : no asthma [COPD] : no COPD [Sleep Apnea] : no sleep apnea [Chronic Anticoagulation] : no chronic anticoagulation [Chronic Kidney Disease] : no chronic kidney disease [Diabetes] : no diabetes [FreeTextEntry1] : right elbow tendon repair [FreeTextEntry2] : 08/09/2024 [FreeTextEntry4] : 43 year old male presents today for pre-op surgical clearance. Patient is scheduled for right elbow tendon repair on 08/09/24. RCRI score: 0 points Class I risk, Dasi score 58.2 (9.89 Mets), Polanco 0.0% risk. Patient had EKG and pre-op labs on Friday in [facility], however did not do PT/INR and PTT.   Patient is also requesting refill for amlodipine 10 qd as he is actively looking for PCP.  [FreeTextEntry3] : Dr. Semaj Griffith

## 2024-08-06 NOTE — REVIEW OF SYSTEMS
[Negative] : Heme/Lymph [Joint Stiffness] : no joint stiffness [Muscle Pain] : no muscle pain [FreeTextEntry9] : dull, aching pain in right elbow with associated muscle weakness and and mild swelling

## 2024-08-06 NOTE — ASSESSMENT
[Patient Optimized for Surgery] : Patient optimized for surgery [FreeTextEntry4] : 43 year old male presents today for pre-op surgical clearance.   Problem 1: Pre-op surgical examination scheduled for right elbow tendon repair on 08/09/24. RCRI 0 points, class I risk, 3.9% 30-day risk of death, MI, or cardiac arrest Polanco score: 0.0% risk of MI or cardiac arrest, intra-op or upto 30 days post-op Dasi score: 58.2 points (9.89 mets) had EKG and pre-op labs at [facility] on 08/02/24 Pt medically optimized for surgery

## 2024-08-06 NOTE — PHYSICAL EXAM
[No Acute Distress] : no acute distress [Supple] : supple [Normal] : affect was normal and insight and judgment were intact [de-identified] : mild tenderness of right antecubital fossa, range of motion limited

## 2024-08-06 NOTE — PHYSICAL EXAM
[No Acute Distress] : no acute distress [Supple] : supple [Normal] : affect was normal and insight and judgment were intact [de-identified] : mild tenderness of right antecubital fossa, range of motion limited

## 2024-08-06 NOTE — HISTORY OF PRESENT ILLNESS
[No Pertinent Cardiac History] : no history of aortic stenosis, atrial fibrillation, coronary artery disease, recent myocardial infarction, or implantable device/pacemaker [Smoker] : smoker [No Adverse Anesthesia Reaction] : no adverse anesthesia reaction in self or family member [Good (7-10 METs)] : Good (7-10 METs) [Asthma] : no asthma [COPD] : no COPD [Sleep Apnea] : no sleep apnea [Chronic Anticoagulation] : no chronic anticoagulation [Chronic Kidney Disease] : no chronic kidney disease [Diabetes] : no diabetes [FreeTextEntry1] : right elbow tendon repair [FreeTextEntry2] : 08/09/2024 [FreeTextEntry4] : 43 year old male presents today for pre-op surgical clearance. Patient is scheduled for right elbow tendon repair on 08/09/24. RCRI score: 0 points Class I risk, Dasi score 58.2 (9.89 Mets), Polanco 0.0% risk. Patient had EKG and pre-op labs on Friday in [facility], however did not do PT/INR and PTT.   Patient is also requesting refill for amlodipine 10 qd as he is actively looking for PCP.  [FreeTextEntry3] : Dr. Semaj Griffith

## 2024-08-06 NOTE — ASSESSMENT
[Patient Optimized for Surgery] : Patient optimized for surgery [FreeTextEntry4] : 43 year old male presents today for pre-op surgical clearance.   Problem 1: Pre-op surgical examination scheduled for right elbow tendon repair on 08/09/24. RCRI 0 points, class I risk, 3.9% 30-day risk of death, MI, or cardiac arrest Polanco score: 0.0% risk of MI or cardiac arrest, intra-op or upto 30 days post-op Dasi score: 58.2 points (9.89 mets) had EKG and pre-op labs at [facility] on 08/02/24 Pt medically optimized for surgery

## 2024-08-08 ENCOUNTER — TRANSCRIPTION ENCOUNTER (OUTPATIENT)
Age: 44
End: 2024-08-08

## 2024-08-08 DIAGNOSIS — Z01.818 ENCOUNTER FOR OTHER PREPROCEDURAL EXAMINATION: ICD-10-CM

## 2024-08-08 DIAGNOSIS — I10 ESSENTIAL (PRIMARY) HYPERTENSION: ICD-10-CM

## 2024-08-08 DIAGNOSIS — S46.211A STRAIN OF MUSCLE, FASCIA AND TENDON OF OTHER PARTS OF BICEPS, RIGHT ARM, INITIAL ENCOUNTER: ICD-10-CM

## 2024-08-09 ENCOUNTER — OUTPATIENT (OUTPATIENT)
Dept: OUTPATIENT SERVICES | Facility: HOSPITAL | Age: 44
LOS: 1 days | End: 2024-08-09
Payer: COMMERCIAL

## 2024-08-09 ENCOUNTER — APPOINTMENT (OUTPATIENT)
Dept: ORTHOPEDIC SURGERY | Facility: HOSPITAL | Age: 44
End: 2024-08-09

## 2024-08-09 ENCOUNTER — TRANSCRIPTION ENCOUNTER (OUTPATIENT)
Age: 44
End: 2024-08-09

## 2024-08-09 VITALS
RESPIRATION RATE: 18 BRPM | HEART RATE: 100 BPM | DIASTOLIC BLOOD PRESSURE: 80 MMHG | SYSTOLIC BLOOD PRESSURE: 124 MMHG | OXYGEN SATURATION: 100 %

## 2024-08-09 VITALS
HEART RATE: 76 BPM | SYSTOLIC BLOOD PRESSURE: 138 MMHG | TEMPERATURE: 98 F | OXYGEN SATURATION: 100 % | HEIGHT: 70.5 IN | DIASTOLIC BLOOD PRESSURE: 91 MMHG | WEIGHT: 179.24 LBS | RESPIRATION RATE: 18 BRPM

## 2024-08-09 DIAGNOSIS — S46.219A STRAIN OF MUSCLE, FASCIA AND TENDON OF OTHER PARTS OF BICEPS, UNSPECIFIED ARM, INITIAL ENCOUNTER: ICD-10-CM

## 2024-08-09 DIAGNOSIS — Z01.818 ENCOUNTER FOR OTHER PREPROCEDURAL EXAMINATION: ICD-10-CM

## 2024-08-09 DIAGNOSIS — N44.00 TORSION OF TESTIS, UNSPECIFIED: Chronic | ICD-10-CM

## 2024-08-09 PROCEDURE — 24342 REPAIR OF RUPTURED TENDON: CPT | Mod: RT

## 2024-08-09 PROCEDURE — 23430 REPAIR BICEPS TENDON: CPT | Mod: AS,RT

## 2024-08-09 PROCEDURE — C1713: CPT

## 2024-08-09 PROCEDURE — 76000 FLUOROSCOPY <1 HR PHYS/QHP: CPT

## 2024-08-09 DEVICE — SYS IMPL DELIVERY DISTAL BICEPS BC: Type: IMPLANTABLE DEVICE | Site: RIGHT | Status: FUNCTIONAL

## 2024-08-09 RX ORDER — AMLODIPINE BESYLATE 2.5 MG/1
1 TABLET ORAL
Refills: 0 | DISCHARGE

## 2024-08-09 RX ORDER — OXYCODONE HYDROCHLORIDE 30 MG/1
5 TABLET ORAL ONCE
Refills: 0 | Status: DISCONTINUED | OUTPATIENT
Start: 2024-08-09 | End: 2024-08-09

## 2024-08-09 RX ORDER — DEXTROSE MONOHYDRATE, SODIUM CHLORIDE, SODIUM LACTATE, CALCIUM CHLORIDE, MAGNESIUM CHLORIDE 1.5; 538; 448; 18.4; 5.08 G/100ML; MG/100ML; MG/100ML; MG/100ML; MG/100ML
1000 SOLUTION INTRAPERITONEAL
Refills: 0 | Status: DISCONTINUED | OUTPATIENT
Start: 2024-08-09 | End: 2024-08-09

## 2024-08-09 RX ORDER — APREPITANT 40 MG
40 CAPSULE ORAL ONCE
Refills: 0 | Status: COMPLETED | OUTPATIENT
Start: 2024-08-09 | End: 2024-08-09

## 2024-08-09 RX ORDER — OXYCODONE HYDROCHLORIDE 30 MG/1
1 TABLET ORAL
Qty: 25 | Refills: 0
Start: 2024-08-09

## 2024-08-09 RX ORDER — HYDROMORPHONE HCL IN 0.9% NACL 0.2 MG/ML
0.5 PLASTIC BAG, INJECTION (ML) INTRAVENOUS
Refills: 0 | Status: DISCONTINUED | OUTPATIENT
Start: 2024-08-09 | End: 2024-08-09

## 2024-08-09 RX ORDER — ONDANSETRON HCL/PF 4 MG/2 ML
4 VIAL (ML) INJECTION ONCE
Refills: 0 | Status: DISCONTINUED | OUTPATIENT
Start: 2024-08-09 | End: 2024-08-09

## 2024-08-09 RX ORDER — NAPROXEN 250 MG
1 TABLET ORAL
Qty: 42 | Refills: 0
Start: 2024-08-09 | End: 2024-08-29

## 2024-08-09 RX ORDER — CEFAZOLIN SODIUM 10 G
2000 VIAL (EA) INJECTION ONCE
Refills: 0 | Status: COMPLETED | OUTPATIENT
Start: 2024-08-09 | End: 2024-08-09

## 2024-08-09 RX ORDER — CHLORHEXIDINE GLUCONATE 500 MG/1
1 CLOTH TOPICAL ONCE
Refills: 0 | Status: COMPLETED | OUTPATIENT
Start: 2024-08-09 | End: 2024-08-09

## 2024-08-09 RX ADMIN — CHLORHEXIDINE GLUCONATE 1 APPLICATION(S): 500 CLOTH TOPICAL at 11:42

## 2024-08-09 RX ADMIN — Medication 40 MILLIGRAM(S): at 11:42

## 2024-08-09 RX ADMIN — Medication 0.5 MILLIGRAM(S): at 15:47

## 2024-08-09 NOTE — ASU PATIENT PROFILE, ADULT - FALL HARM RISK - UNIVERSAL INTERVENTIONS
After Visit Summary   4/5/2017    Jack Marrero    MRN: [id number]           Patient Information     Date Of Birth          1956        Visit Information        Provider Department      4/5/2017 2:00 PM Radames Pak MD [facility]        Today's Diagnoses     Restless legs syndrome (RLS)        SHY (obstructive sleep apnea)          Care Instructions    Options include waiting for 6 months on CPAP to see how you are doing or switching to an alternative Restless Legs Syndrome medication to see if that improves the grogginess.  If we were to switch, I would start you on Mirapex as you taper off Gabapentin.    One idea:  Go down to 600 mg (2 tabs) of Gabapentin for the next week and call or Mychart me to let me know how things are going.  If the grogginess resolves but the Restless Legs Syndrome returns we have our answer.  If not we can still use the information and I can always electronically send a new prescription to your pharmacy for the Mirapex if we want to try it.      Your BMI is Body mass index is 25.99 kg/(m^2).  Weight management is a personal decision.  If you are interested in exploring weight loss strategies, the following discussion covers the approaches that may be successful. Body mass index (BMI) is one way to tell whether you are at a healthy weight, overweight, or obese. It measures your weight in relation to your height.  A BMI of 18.5 to 24.9 is in the healthy range. A person with a BMI of 25 to 29.9 is considered overweight, and someone with a BMI of 30 or greater is considered obese. More than two-thirds of American adults are considered overweight or obese.  Being overweight or obese increases the risk for further weight gain. Excess weight may lead to heart disease and diabetes.  Creating and following plans for healthy eating and physical activity may help you improve your health.  Weight control is part of healthy lifestyle and includes  exercise, emotional health, and healthy eating habits. Careful eating habits lifelong are the mainstay of weight control. Though there are significant health benefits from weight loss, long-term weight loss with diet alone may be very difficult to achieve- studies show long-term success with dietary management in less than 10% of people. Attaining a healthy weight may be especially difficult to achieve in those with severe obesity. In some cases, medications, devices and surgical management might be considered.  What can you do?  If you are overweight or obese and are interested in methods for weight loss, you should discuss this with your provider.     Consider reducing daily calorie intake by 500 calories.     Keep a food journal.     Avoiding skipping meals, consider cutting portions instead.    Diet combined with exercise helps maintain muscle while optimizing fat loss. Strength training is particularly important for building and maintaining muscle mass. Exercise helps reduce stress, increase energy, and improves fitness. Increasing exercise without diet control, however, may not burn enough calories to loose weight.       Start walking three days a week 10-20 minutes at a time    Work towards walking thirty minutes five days a week     Eventually, increase the speed of your walking for 1-2 minutes at time    In addition, we recommend that you review healthy lifestyles and methods for weight loss available through the National Institutes of Health patient information sites:  http://win.niddk.nih.gov/publications/index.htm    And look into health and wellness programs that may be available through your health insurance provider, employer, local community center, or garry club.    Weight management plan: Patient was referred to their PCP to discuss a diet and exercise plan.            Follow-ups after your visit        Follow-up notes from your care team     Return in about 1 year (around 4/5/2018).      Who to  "contact     If you have questions or need follow up information about today's clinic visit or your schedule please contact [facility] directly at [phone].  Normal or non-critical lab and imaging results will be communicated to you by MyChart, letter or phone within 4 business days after the clinic has received the results. If you do not hear from us within 7 days, please contact the clinic through CaseRev[employer] or phone. If you have a critical or abnormal lab result, we will notify you by phone as soon as possible.  Submit refill requests through FloTime or call your pharmacy and they will forward the refill request to us. Please allow 3 business days for your refill to be completed.          Additional Information About Your Visit        FloTime Information     FloTime gives you secure access to your electronic health record. If you see a primary care provider, you can also send messages to your care team and make appointments. If you have questions, please call your primary care clinic.  If you do not have a primary care provider, please call [phone] and they will assist you.        Care EveryWhere ID     This is your Care EveryWhere ID. This could be used by other organizations to access your [facility] medical records  [id number]        Your Vitals Were     Pulse Height Pulse Oximetry BMI (Body Mass Index)          82 1.854 m (6' 1\") 97% 25.99 kg/m2         Blood Pressure from Last 3 Encounters:   04/05/17 130/86   01/12/17 126/85   12/15/16 (!) 137/92    Weight from Last 3 Encounters:   04/05/17 89.4 kg (197 lb)   01/12/17 88.5 kg (195 lb)   12/15/16 87.5 kg (193 lb)              Today, you had the following     No orders found for display       Primary Care Provider Office Phone # Fax #    Jamshid Miller [phone]       [facility] [address] [facility] [address]        Thank you!     Thank you for choosing [facility]  for your care. " Our goal is always to provide you with excellent care. Hearing back from our patients is one way we can continue to improve our services. Please take a few minutes to complete the written survey that you may receive in the mail after your visit with us. Thank you!             Your Updated Medication List - Protect others around you: Learn how to safely use, store and throw away your medicines at www.disposemymeds.org.          This list is accurate as of: 4/5/17  2:29 PM.  Always use your most recent med list.                   Brand Name Dispense Instructions for use    aspirin 81 MG tablet      Take 1 tablet by mouth daily.       atorvastatin 40 MG tablet    LIPITOR    90 tablet    Take 1 tablet (40 mg) by mouth daily       dutasteride 0.5 MG capsule    AVODART         gabapentin 300 MG capsule    NEURONTIN         NICOTROL 10 MG Inhaler   Generic drug:  nicotine          order for DME      Equipment ordered: RESMED Auto PAP Mask type: Nasal Settings: 6-12 cm H2O          Bed in lowest position, wheels locked, appropriate side rails in place/Call bell, personal items and telephone in reach/Instruct patient to call for assistance before getting out of bed or chair/Non-slip footwear when patient is out of bed/[place] to call system/Physically safe environment - no spills, clutter or unnecessary equipment/Purposeful Proactive Rounding/Room/bathroom lighting operational, light cord in reach

## 2024-08-09 NOTE — ASU PREOP CHECKLIST - ASSESSMENT, HISTORY & PHYSICAL COMPLETED AND ON MEDICAL RECORD
----- Message from Gissell More sent at 3/5/2018  2:36 PM CST -----  Contact: wife  Wife - Mali Kumari - [phone] is calling/has questions concerning patient's lab work that is scheduled for next week/please call   done

## 2024-08-09 NOTE — ASU DISCHARGE PLAN (ADULT/PEDIATRIC) - ASU DC SPECIAL INSTRUCTIONSFT
Use the sling for support when ambulating.  Use the posiblock for elevation when resting.  Move the right wrist and right hand by making a gentle fist and fully extending the fingers 10 times per hour.    For Constipation :   • Increase your water intake. Drink at least 8 glasses of water daily.  • Try adding fiber to your diet by eating fruits, vegetables and foods that are rich in grains.  • If you do experience constipation, you may take an over-the-counter stool softener/laxative such as Leah Colace, Senekot or  Milk of Magnesia.

## 2024-08-09 NOTE — ASU DISCHARGE PLAN (ADULT/PEDIATRIC) - CARE PROVIDER_API CALL
Semaj Griffith  Orthopaedic Surgery  [address], [address]  [place] [address]  Phone: [phone]  Fax: [phone]  Established Patient  Follow Up Time: 2 weeks

## 2024-08-09 NOTE — ASU PATIENT PROFILE, ADULT - ABILITY TO HEAR (WITH HEARING AID OR HEARING APPLIANCE IF NORMALLY USED):
[Follow-Up: _____] : a [unfilled] follow-up visit [Other: _____] : [unfilled] [Spouse] : spouse [FreeTextEntry2] : Enlarging multinodular goiter Adequate: hears normal conversation without difficulty

## 2024-08-09 NOTE — ASU DISCHARGE PLAN (ADULT/PEDIATRIC) - NS MD DC FALL RISK RISK
For information on Fall & Injury Prevention, visit: [email or web address] OR  [email or web address] OR  https://www.cdc.gov/steadi/patient.html

## 2024-08-09 NOTE — ASU DISCHARGE PLAN (ADULT/PEDIATRIC) - COMMENTS
Please call the office to make an appointment for your first post operative visit with Dr Griffith to remove the splint and post operative dressing.

## 2024-08-12 ENCOUNTER — NON-APPOINTMENT (OUTPATIENT)
Age: 44
End: 2024-08-12

## 2024-08-12 ENCOUNTER — TRANSCRIPTION ENCOUNTER (OUTPATIENT)
Age: 44
End: 2024-08-12

## 2024-08-16 ENCOUNTER — APPOINTMENT (OUTPATIENT)
Dept: ORTHOPEDIC SURGERY | Facility: CLINIC | Age: 44
End: 2024-08-16
Payer: COMMERCIAL

## 2024-08-16 DIAGNOSIS — S46.211D STRAIN OF MUSCLE, FASCIA AND TENDON OF OTHER PARTS OF BICEPS, RIGHT ARM, SUBSEQUENT ENCOUNTER: ICD-10-CM

## 2024-08-16 PROCEDURE — 73080 X-RAY EXAM OF ELBOW: CPT | Mod: RT

## 2024-08-16 PROCEDURE — 99024 POSTOP FOLLOW-UP VISIT: CPT

## 2024-08-16 RX ORDER — CYCLOBENZAPRINE HYDROCHLORIDE 5 MG/1
5 TABLET, FILM COATED ORAL
Qty: 50 | Refills: 0 | Status: ACTIVE | COMMUNITY
Start: 2024-08-16 | End: 1900-01-01

## 2024-09-18 ENCOUNTER — APPOINTMENT (OUTPATIENT)
Dept: ORTHOPEDIC SURGERY | Facility: CLINIC | Age: 44
End: 2024-09-18

## 2024-09-25 ENCOUNTER — APPOINTMENT (OUTPATIENT)
Dept: ORTHOPEDIC SURGERY | Facility: CLINIC | Age: 44
End: 2024-09-25
Payer: COMMERCIAL

## 2024-09-25 VITALS — BODY MASS INDEX: 26.34 KG/M2 | HEIGHT: 70 IN | WEIGHT: 184 LBS

## 2024-09-25 DIAGNOSIS — S46.211D STRAIN OF MUSCLE, FASCIA AND TENDON OF OTHER PARTS OF BICEPS, RIGHT ARM, SUBSEQUENT ENCOUNTER: ICD-10-CM

## 2024-09-25 PROCEDURE — 99024 POSTOP FOLLOW-UP VISIT: CPT

## 2024-11-06 ENCOUNTER — APPOINTMENT (OUTPATIENT)
Dept: ORTHOPEDIC SURGERY | Facility: CLINIC | Age: 44
End: 2024-11-06
Payer: COMMERCIAL

## 2024-11-06 VITALS — BODY MASS INDEX: 26.34 KG/M2 | WEIGHT: 184 LBS | HEIGHT: 70 IN

## 2024-11-06 DIAGNOSIS — S46.211D STRAIN OF MUSCLE, FASCIA AND TENDON OF OTHER PARTS OF BICEPS, RIGHT ARM, SUBSEQUENT ENCOUNTER: ICD-10-CM

## 2024-11-06 PROCEDURE — 99024 POSTOP FOLLOW-UP VISIT: CPT

## 2024-11-11 ENCOUNTER — APPOINTMENT (OUTPATIENT)
Dept: INTERNAL MEDICINE | Facility: CLINIC | Age: 44
End: 2024-11-11

## 2024-11-19 ENCOUNTER — APPOINTMENT (OUTPATIENT)
Dept: INTERNAL MEDICINE | Facility: CLINIC | Age: 44
End: 2024-11-19

## 2024-12-04 ENCOUNTER — APPOINTMENT (OUTPATIENT)
Dept: ORTHOPEDIC SURGERY | Facility: CLINIC | Age: 44
End: 2024-12-04
Payer: COMMERCIAL

## 2024-12-04 VITALS — WEIGHT: 184 LBS | BODY MASS INDEX: 26.34 KG/M2 | HEIGHT: 70 IN

## 2024-12-04 DIAGNOSIS — S46.211D STRAIN OF MUSCLE, FASCIA AND TENDON OF OTHER PARTS OF BICEPS, RIGHT ARM, SUBSEQUENT ENCOUNTER: ICD-10-CM

## 2024-12-04 PROCEDURE — 99213 OFFICE O/P EST LOW 20 MIN: CPT

## 2024-12-07 ENCOUNTER — NON-APPOINTMENT (OUTPATIENT)
Age: 44
End: 2024-12-07

## 2024-12-20 ENCOUNTER — NON-APPOINTMENT (OUTPATIENT)
Age: 44
End: 2024-12-20

## 2025-01-15 ENCOUNTER — APPOINTMENT (OUTPATIENT)
Dept: INTERNAL MEDICINE | Facility: CLINIC | Age: 45
End: 2025-01-15

## 2025-04-14 ENCOUNTER — NON-APPOINTMENT (OUTPATIENT)
Age: 45
End: 2025-04-14

## 2025-04-16 ENCOUNTER — APPOINTMENT (OUTPATIENT)
Dept: INTERNAL MEDICINE | Facility: CLINIC | Age: 45
End: 2025-04-16
Payer: COMMERCIAL

## 2025-04-16 VITALS
SYSTOLIC BLOOD PRESSURE: 178 MMHG | HEIGHT: 70 IN | WEIGHT: 187 LBS | TEMPERATURE: 97.6 F | HEART RATE: 98 BPM | BODY MASS INDEX: 26.77 KG/M2 | DIASTOLIC BLOOD PRESSURE: 110 MMHG | RESPIRATION RATE: 16 BRPM | OXYGEN SATURATION: 99 %

## 2025-04-16 DIAGNOSIS — Z00.00 ENCOUNTER FOR GENERAL ADULT MEDICAL EXAMINATION W/OUT ABNORMAL FINDINGS: ICD-10-CM

## 2025-04-16 DIAGNOSIS — M62.838 OTHER MUSCLE SPASM: ICD-10-CM

## 2025-04-16 DIAGNOSIS — R97.20 ELEVATED PROSTATE, SPECIFIC ANTIGEN [PSA]: ICD-10-CM

## 2025-04-16 DIAGNOSIS — S46.211D STRAIN OF MUSCLE, FASCIA AND TENDON OF OTHER PARTS OF BICEPS, RIGHT ARM, SUBSEQUENT ENCOUNTER: ICD-10-CM

## 2025-04-16 DIAGNOSIS — I10 ESSENTIAL (PRIMARY) HYPERTENSION: ICD-10-CM

## 2025-04-16 DIAGNOSIS — N40.0 BENIGN PROSTATIC HYPERPLASIA WITHOUT LOWER URINARY TRACT SYMPMS: ICD-10-CM

## 2025-04-16 LAB
ALBUMIN SERPL ELPH-MCNC: 4.7 G/DL
ALP BLD-CCNC: 67 U/L
ALT SERPL-CCNC: 29 U/L
ANION GAP SERPL CALC-SCNC: 14 MMOL/L
AST SERPL-CCNC: 22 U/L
BILIRUB SERPL-MCNC: 0.3 MG/DL
BUN SERPL-MCNC: 13 MG/DL
CALCIUM SERPL-MCNC: 10.3 MG/DL
CHLORIDE SERPL-SCNC: 101 MMOL/L
CHOLEST SERPL-MCNC: 244 MG/DL
CO2 SERPL-SCNC: 22 MMOL/L
CREAT SERPL-MCNC: 0.97 MG/DL
EGFRCR SERPLBLD CKD-EPI 2021: 100 ML/MIN/1.73M2
GLUCOSE SERPL-MCNC: 88 MG/DL
HDLC SERPL-MCNC: 60 MG/DL
LDLC SERPL-MCNC: 149 MG/DL
MAGNESIUM SERPL-MCNC: 1.9 MG/DL
NONHDLC SERPL-MCNC: 184 MG/DL
PHOSPHATE SERPL-MCNC: 4.2 MG/DL
POTASSIUM SERPL-SCNC: 4.9 MMOL/L
PROT SERPL-MCNC: 7.6 G/DL
PSA SERPL-MCNC: 1.26 NG/ML
SODIUM SERPL-SCNC: 138 MMOL/L
TRIGL SERPL-MCNC: 196 MG/DL

## 2025-04-16 PROCEDURE — 99396 PREV VISIT EST AGE 40-64: CPT

## 2025-04-16 PROCEDURE — 99214 OFFICE O/P EST MOD 30 MIN: CPT | Mod: 25

## 2025-04-16 PROCEDURE — 99406 BEHAV CHNG SMOKING 3-10 MIN: CPT | Mod: 25

## 2025-04-16 RX ORDER — IBUPROFEN 400 MG/1
400 TABLET, FILM COATED ORAL 3 TIMES DAILY
Qty: 30 | Refills: 0 | Status: ACTIVE | COMMUNITY
Start: 2025-04-16 | End: 1900-01-01

## 2025-04-17 LAB
BASOPHILS # BLD AUTO: 0.04 K/UL
BASOPHILS NFR BLD AUTO: 0.6 %
EOSINOPHIL # BLD AUTO: 0.11 K/UL
EOSINOPHIL NFR BLD AUTO: 1.6 %
ESTIMATED AVERAGE GLUCOSE: 120 MG/DL
HBA1C MFR BLD HPLC: 5.8 %
HCT VFR BLD CALC: 43.3 %
HCV AB SER QL: NONREACTIVE
HCV S/CO RATIO: 0.15 S/CO
HGB BLD-MCNC: 14.1 G/DL
IMM GRANULOCYTES NFR BLD AUTO: 0.3 %
LYMPHOCYTES # BLD AUTO: 2.46 K/UL
LYMPHOCYTES NFR BLD AUTO: 35 %
MAN DIFF?: NORMAL
MCHC RBC-ENTMCNC: 29.9 PG
MCHC RBC-ENTMCNC: 32.6 G/DL
MCV RBC AUTO: 91.9 FL
MONOCYTES # BLD AUTO: 0.77 K/UL
MONOCYTES NFR BLD AUTO: 11 %
NEUTROPHILS # BLD AUTO: 3.63 K/UL
NEUTROPHILS NFR BLD AUTO: 51.5 %
PLATELET # BLD AUTO: 319 K/UL
RBC # BLD: 4.71 M/UL
RBC # FLD: 16.3 %
WBC # FLD AUTO: 7.03 K/UL

## 2025-05-01 DIAGNOSIS — E78.5 HYPERLIPIDEMIA, UNSPECIFIED: ICD-10-CM

## 2025-05-01 RX ORDER — ROSUVASTATIN CALCIUM 5 MG/1
5 TABLET, FILM COATED ORAL
Qty: 90 | Refills: 1 | Status: ACTIVE | COMMUNITY
Start: 2025-05-01 | End: 1900-01-01

## (undated) DEVICE — ELCTR BOVIE PENCIL BLADE 10FT

## (undated) DEVICE — SUCTION YANKAUER OPEN TIP NO VENT CURVE

## (undated) DEVICE — SUT VICRYL 2-0 18" TIES

## (undated) DEVICE — PACK UPPER EXTREMITY

## (undated) DEVICE — DRSG STERISTRIPS 0.5 X 4"

## (undated) DEVICE — CANISTER SUCTION LID GUARD 3000CC

## (undated) DEVICE — POSITIONER STIRRUP STRAP W SLIP RING 19X3.5"

## (undated) DEVICE — DRSG RESTRAINT LIMB WRAP AROUND

## (undated) DEVICE — SYR LUER LOK 10CC

## (undated) DEVICE — DRSG COMBINE 5X9"

## (undated) DEVICE — TOURNIQUET CUFF 18" DUAL PORT SINGLE BLADDER W PLC  (BLACK)

## (undated) DEVICE — POSITIONER STRAP ARMBOARD VELCRO TS-30

## (undated) DEVICE — DRSG CURITY GAUZE SPONGE 4 X 4" 12-PLY

## (undated) DEVICE — SOL IRR POUR H2O 1500ML

## (undated) DEVICE — SPECIMEN CONTAINER PET

## (undated) DEVICE — GLV 7 PROTEXIS (WHITE)

## (undated) DEVICE — BOOT COVER NONSKID IMPERVIOUS

## (undated) DEVICE — SLING ARM CHIEFTAIN MESH LARGE

## (undated) DEVICE — SUT POLYSORB 0 30" GS-12 UNDYED

## (undated) DEVICE — SOL IRR POUR NS 0.9% 500ML

## (undated) DEVICE — WARMING BLANKET LOWER ADULT

## (undated) DEVICE — TOURNIQUET ESMARK 4"

## (undated) DEVICE — SUT POLYSORB 2-0 30" C-14 UNDYED

## (undated) DEVICE — GLV 8 PROTEXIS (WHITE)

## (undated) DEVICE — VENODYNE/SCD SLEEVE CALF MEDIUM

## (undated) DEVICE — DRAPE C ARM UNIVERSAL

## (undated) DEVICE — DRSG WEBRIL 4"

## (undated) DEVICE — NDL HYPO SAFE 22G X 1.5" (BLACK)

## (undated) DEVICE — GLV 6.5 PROTEXIS (WHITE)

## (undated) DEVICE — ELCTR BOVIE TIP BLADE INSULATED 3" EDGE

## (undated) DEVICE — SOLIDIFIER CANN EXPRESS 3K

## (undated) DEVICE — SUT BIOSYN 4-0 18" P-12

## (undated) DEVICE — LAP PAD 18 X 18"

## (undated) DEVICE — SUT POLYSORB 1 27" GS-21 UNDYED